# Patient Record
Sex: FEMALE | Race: BLACK OR AFRICAN AMERICAN | Employment: FULL TIME | URBAN - METROPOLITAN AREA
[De-identification: names, ages, dates, MRNs, and addresses within clinical notes are randomized per-mention and may not be internally consistent; named-entity substitution may affect disease eponyms.]

---

## 2017-01-20 ENCOUNTER — OFFICE VISIT (OUTPATIENT)
Dept: OBGYN CLINIC | Age: 57
End: 2017-01-20

## 2017-01-20 DIAGNOSIS — R10.2 PELVIC PAIN IN FEMALE: Primary | ICD-10-CM

## 2017-01-20 NOTE — PROGRESS NOTES
Pelvic Pain evaluation    Pio Oropeza is a 49 y.o. female who complains of pelvic pain. The pain is described as pulsing, and is 5-10/10 in intensity. Pain is located in the lower abdomen diffusely with radiation to lower back. The pain started 2 months ago. Her symptoms have been unchanged since. Aggravating factors: intercourse. Alleviating factors: none. Associated symptoms: frequency. She is also currently seeing a urologist for this problem. The patient denies constipation and diarrhea. Past Medical History   Diagnosis Date    Elevated cholesterol     Interstitial cystitis     MTHFR mutation [facility])      Past Surgical History   Procedure Laterality Date    Hx left salpingo-oophorectomy      Hx total vaginal hysterectomy       Social History     Occupational History    Not on file. Social History Main Topics    Smoking status: Never Smoker    Smokeless tobacco: Not on file    Alcohol use No    Drug use: No    Sexual activity: Not Currently     Family History   Problem Relation Age of Onset    Breast Cancer         No Known Allergies  Prior to Admission medications    Medication Sig Start Date End Date Taking? Authorizing Provider   simvastatin (ZOCOR) 10 mg tablet  5/13/16   Historical Provider   butalbital-acetaminophen-caffeine (ESGIC PLUS) -40 mg per tablet Take 1 Tab by mouth every four (4) hours as needed for Pain. Historical Provider   melatonin tab tablet Take 1 Tab by mouth nightly. For cluster-headache prevention 5/19/16   Elmira Stevenson MD   amitriptyline (ELAVIL) 25 mg tablet Take 1 to 2 tablets at bedtime. For headache prevention. Caution: may cause drowsiness, dry mouth, weight gain.  5/19/16   Elmira Stevenson MD   SUMAtriptan (IMITREX) 100 mg tablet 1 tab at onset of moderate-severe headache; may repeat 1 tab in 2 hours; Limit: 2 tabs in 24/ hrs, not more than 3 days a week 5/19/16   Elmira Stevenson MD   LORazepam (ATIVAN) 1 mg tablet 1 mg as needed. 3/2/16   Historical Provider   ELIDEL 1 % topical cream  3/15/16   Historical Provider   traZODone (DESYREL) 50 mg tablet  4/6/16   Historical Provider   estradiol (VIVELLE-DOT) 0.0375 mg/24 hr Apply twice a week 4/11/16   Amber Conway MD   PARoxetine mesylate (BRISDELLE) 7.5 mg cap Take 7.5 capsules by mouth daily. 12/31/14   Amber Conway MD   simvastatin (ZOCOR) 20 mg tablet Take  by mouth nightly. Historical Provider        Review of Systems: History obtained from the patient  Constitutional: negative for weight loss, fever, night sweats  Breast: negative for breast lumps, nipple discharge, galactorrhea  GI: negative for change in bowel habits, abdominal pain, black or bloody stools  : negative for frequency, dysuria, hematuria, vaginal discharge  MSK: negative for back pain, joint pain, muscle pain  Skin: negative for itching, rash, hives  Psych: negative for anxiety, depression, change in mood      Objective: There were no vitals taken for this visit.     Physical Exam:     Constitutional  · Appearance: well-nourished, well developed, alert, in no acute distress    Gastrointestinal  · Abdominal Examination: abdomen non-tender to palpation, normal bowel sounds, no masses present  · Liver and spleen: no hepatomegaly present, spleen not palpable  · Hernias: no hernias identified    Genitourinary  · External Genitalia: normal appearance for age, no discharge present, no tenderness present, no inflammatory lesions present, no masses present, no atrophy present  · Vagina: normal vaginal vault without central or paravaginal defects, no discharge present, no inflammatory lesions present, no masses present  · Bladder: non-tender to palpation  · Urethra: appears normal  · Cervix: absent   · Uterus: absent  · Adnexa: no adnexal tenderness present, no adnexal masses present  · Perineum: perineum within normal limits, no evidence of trauma, no rashes or skin lesions present  · Anus: anus within normal limits, no hemorrhoids present  · Inguinal Lymph Nodes: no lymphadenopathy present    Skin  · General Inspection: no rash, no lesions identified    Neurologic/Psychiatric  · Mental Status:  · Orientation: grossly oriented to person, place and time  · Mood and Affect: mood normal, affect appropriate    Assessment:  Pelvic pain of ?etiology. Plan:   I recommend an US to assess her ovary. She has a CT scheduled by Urology and will get that first..

## 2017-01-20 NOTE — PATIENT INSTRUCTIONS
Pelvic Pain: Care Instructions  Your Care Instructions    Pelvic pain, or pain in the lower belly, can have many causes. Often pelvic pain is not serious and gets better in a few days. If your pain continues or gets worse, you may need tests and treatment. Tell your doctor about any new symptoms. These may be signs of a serious problem. Follow-up care is a key part of your treatment and safety. Be sure to make and go to all appointments, and call your doctor if you are having problems. It's also a good idea to know your test results and keep a list of the medicines you take. How can you care for yourself at home? · Rest until you feel better. Lie down, and raise your legs by placing a pillow under your knees. · Drink plenty of fluids. You may find that small, frequent sips are easier on your stomach than if you drink a lot at once. Avoid drinks with carbonation or caffeine, such as soda pop, tea, or coffee. · Try eating several small meals instead of 2 or 3 large ones. Eat mild foods, such as rice, dry toast or crackers, bananas, and applesauce. Avoid fatty and spicy foods, other fruits, and alcohol until 48 hours after your symptoms have gone away. · Take an over-the-counter pain medicine, such as acetaminophen (Tylenol), ibuprofen (Advil, Motrin), or naproxen (Aleve). Read and follow all instructions on the label. · Do not take two or more pain medicines at the same time unless the doctor told you to. Many pain medicines have acetaminophen, which is Tylenol. Too much acetaminophen (Tylenol) can be harmful. · You can put a heating pad, a warm cloth, or moist heat on your belly to relieve pain. When should you call for help? Call 911 anytime you think you may need emergency care. For example, call if:  · You passed out (lost consciousness). Call your doctor now or seek immediate medical care if:  · Your pain is getting worse. · Your pain becomes focused in one area of your belly.   · You have severe vaginal bleeding. Severe means that you are soaking through your usual pads or tampons every hour for 2 or more hours and passing clots of blood. · You have new symptoms such as fever, urinary problems or unexpected vaginal bleeding. · You are dizzy or lightheaded, or you feel like you may faint. Watch closely for changes in your health, and be sure to contact your doctor if:  · You do not get better as expected. Where can you learn more? Go to http://debbie-michael.info/. Enter [phone] in the search box to learn more about \"Pelvic Pain: Care Instructions. \"  Current as of: February 25, 2016  Content Version: 11.1  © 1426-8883 [employer]. Care instructions adapted under license by [employer] (which disclaims liability or warranty for this information). If you have questions about a medical condition or this instruction, always ask your healthcare professional. [address] any warranty or liability for your use of this information.

## 2017-02-15 ENCOUNTER — TELEPHONE (OUTPATIENT)
Dept: OBGYN CLINIC | Age: 57
End: 2017-02-15

## 2017-02-15 RX ORDER — ESTRADIOL 0.04 MG/D
FILM, EXTENDED RELEASE TRANSDERMAL
Qty: 24 PATCH | Refills: 1 | Status: SHIPPED | OUTPATIENT
Start: 2017-02-15 | End: 2017-04-13

## 2017-02-15 NOTE — TELEPHONE ENCOUNTER
Pt krishan for a refill on her Estradiol to be sent to [facility] pharmacy in [place] on [address]. Refilled medication until AE 4/2017.

## 2017-04-13 ENCOUNTER — OFFICE VISIT (OUTPATIENT)
Dept: OBGYN CLINIC | Age: 57
End: 2017-04-13

## 2017-04-13 ENCOUNTER — APPOINTMENT (OUTPATIENT)
Dept: MAMMOGRAPHY | Age: 57
End: 2017-04-13

## 2017-04-13 VITALS
HEART RATE: 78 BPM | BODY MASS INDEX: 25.34 KG/M2 | HEIGHT: 63 IN | DIASTOLIC BLOOD PRESSURE: 80 MMHG | WEIGHT: 143 LBS | RESPIRATION RATE: 19 BRPM | SYSTOLIC BLOOD PRESSURE: 131 MMHG

## 2017-04-13 DIAGNOSIS — Z12.31 ENCOUNTER FOR SCREENING MAMMOGRAM FOR MALIGNANT NEOPLASM OF BREAST: ICD-10-CM

## 2017-04-13 DIAGNOSIS — Z11.51 SPECIAL SCREENING EXAMINATION FOR HUMAN PAPILLOMAVIRUS (HPV): ICD-10-CM

## 2017-04-13 DIAGNOSIS — Z01.419 WELL FEMALE EXAM WITH ROUTINE GYNECOLOGICAL EXAM: Primary | ICD-10-CM

## 2017-04-13 RX ORDER — ESTRADIOL 0.04 MG/D
1 FILM, EXTENDED RELEASE TRANSDERMAL 2 TIMES WEEKLY
Qty: 24 PATCH | Refills: 3 | Status: SHIPPED | OUTPATIENT
Start: 2017-04-14 | End: 2018-09-18 | Stop reason: SDUPTHER

## 2017-04-13 NOTE — PATIENT INSTRUCTIONS
Well Visit, Women 48 to 72: Care Instructions  Your Care Instructions  Physical exams can help you stay healthy. Your doctor has checked your overall health and may have suggested ways to take good care of yourself. He or she also may have recommended tests. At home, you can help prevent illness with healthy eating, regular exercise, and other steps. Follow-up care is a key part of your treatment and safety. Be sure to make and go to all appointments, and call your doctor if you are having problems. It's also a good idea to know your test results and keep a list of the medicines you take. How can you care for yourself at home? · Reach and stay at a healthy weight. This will lower your risk for many problems, such as obesity, diabetes, heart disease, and high blood pressure. · Get at least 30 minutes of exercise on most days of the week. Walking is a good choice. You also may want to do other activities, such as running, swimming, cycling, or playing tennis or team sports. · Do not smoke. Smoking can make health problems worse. If you need help quitting, talk to your doctor about stop-smoking programs and medicines. These can increase your chances of quitting for good. · Protect your skin from too much sun. When you're outdoors from 10 a.m. to 4 p.m., stay in the shade or cover up with clothing and a hat with a wide brim. Wear sunglasses that block UV rays. Even when it's cloudy, put broad-spectrum sunscreen (SPF 30 or higher) on any exposed skin. · See a dentist one or two times a year for checkups and to have your teeth cleaned. · Wear a seat belt in the car. · Limit alcohol to 1 drink a day. Too much alcohol can cause health problems. Follow your doctor's advice about when to have certain tests. These tests can spot problems early. · Cholesterol.  Your doctor will tell you how often to have this done based on your age, family history, or other things that can increase your risk for heart attack and stroke. · Blood pressure. Have your blood pressure checked during a routine doctor visit. Your doctor will tell you how often to check your blood pressure based on your age, your blood pressure results, and other factors. · Mammogram. Ask your doctor how often you should have a mammogram, which is an X-ray of your breasts. A mammogram can spot breast cancer before it can be felt and when it is easiest to treat. · Pap test and pelvic exam. Ask your doctor how often you should have a Pap test. You may not need to have a Pap test as often as you used to. · Vision. Have your eyes checked every year or two or as often as your doctor suggests. Some experts recommend that you have yearly exams for glaucoma and other age-related eye problems starting at age 48. · Hearing. Tell your doctor if you notice any change in your hearing. You can have tests to find out how well you hear. · Diabetes. Ask your doctor whether you should have tests for diabetes. · Colon cancer. You should begin tests for colon cancer at age 48. You may have one of several tests. Your doctor will tell you how often to have tests based on your age and risk. Risks include whether you already had a precancerous polyp removed from your colon or whether your parents, sisters and brothers, or children have had colon cancer. · Thyroid disease. Talk to your doctor about whether to have your thyroid checked as part of a regular physical exam. Women have an increased chance of a thyroid problem. · Osteoporosis. You should begin tests for bone density at age 72. If you are younger than 72, ask your doctor whether you have factors that may increase your risk for this disease. You may want to have this test before age 72. · Heart attack and stroke risk. At least every 4 to 6 years, you should have your risk for heart attack and stroke assessed.  Your doctor uses factors such as your age, blood pressure, cholesterol, and whether you smoke or have diabetes to show what your risk for a heart attack or stroke is over the next 10 years. When should you call for help? Watch closely for changes in your health, and be sure to contact your doctor if you have any problems or symptoms that concern you. Where can you learn more? Go to http://debbie-michael.info/. Enter V782 in the search box to learn more about \"Well Visit, Women 50 to 72: Care Instructions. \"  Current as of: July 19, 2016  Content Version: 11.2  © 9815-7396 [employer]. Care instructions adapted under license by [employer] (which disclaims liability or warranty for this information). If you have questions about a medical condition or this instruction, always ask your healthcare professional. [address] any warranty or liability for your use of this information.

## 2017-04-13 NOTE — PROGRESS NOTES
Sangeeta De La Rosa is a ,  49 y.o. female [address] whose No LMP recorded. Patient has had a hysterectomy. was on  who presents for her annual checkup. She is having significant discharge. She also wants to discuss her pelvic pain. Her lower abdominal pain is better. She is seeing GI and Urology. Hormone Status:    She is not having vasomotor symptoms. The patient is using HRT: vivelle dot    Sexual history:    She  reports that she does not currently engage in sexual activity. Medical conditions:    Since her last annual GYN exam about one year ago, she has had the following changes in her health history: none. Pap and Mammogram History:    Her most recent Pap smear was negative and HPV negative obtained 2 year(s) ago. The patient had her mammogram today in our office. Breast Cancer History/Substance Abuse:    She has no family history of breast cancer. Past Medical History:   Diagnosis Date    Elevated cholesterol     Interstitial cystitis     MTHFR mutation ([facility] [address]      Past Surgical History:   Procedure Laterality Date    HX LEFT SALPINGO-OOPHORECTOMY      HX TOTAL VAGINAL HYSTERECTOMY       Tobacco History:  reports that she has never smoked. She does not have any smokeless tobacco history on file. Alcohol Abuse:  reports that she does not drink alcohol. Drug Abuse:  reports that she does not use illicit drugs. Current Outpatient Prescriptions   Medication Sig Dispense Refill    estradiol (VIVELLE-DOT) 0.0375 mg/24 hr Apply twice a week 24 Patch 1    amitriptyline (ELAVIL) 25 mg tablet Take 1 to 2 tablets at bedtime. For headache prevention. Caution: may cause drowsiness, dry mouth, weight gain. 60 Tab 1    ELIDEL 1 % topical cream       simvastatin (ZOCOR) 10 mg tablet       butalbital-acetaminophen-caffeine (ESGIC PLUS) -40 mg per tablet Take 1 Tab by mouth every four (4) hours as needed for Pain.       melatonin tab tablet Take 1 Tab by mouth nightly. For cluster-headache prevention 30 Tab 11    SUMAtriptan (IMITREX) 100 mg tablet 1 tab at onset of moderate-severe headache; may repeat 1 tab in 2 hours; Limit: 2 tabs in 24/ hrs, not more than 3 days a week 12 Tab 2    LORazepam (ATIVAN) 1 mg tablet 1 mg as needed. 3    traZODone (DESYREL) 50 mg tablet   0    PARoxetine mesylate (BRISDELLE) 7.5 mg cap Take 7.5 capsules by mouth daily. 30 capsule 12    simvastatin (ZOCOR) 20 mg tablet Take  by mouth nightly. Allergies: Morphine and Sulfa (sulfonamide antibiotics)   Social History     Social History    Marital status: SINGLE     Spouse name: N/A    Number of children: N/A    Years of education: N/A     Occupational History    Not on file.      Social History Main Topics    Smoking status: Never Smoker    Smokeless tobacco: Not on file    Alcohol use No    Drug use: No    Sexual activity: Not Currently     Other Topics Concern    Not on file     Social History Narrative     Patient Active Problem List   Diagnosis Code    Transient right leg weakness R29.898    Hyperreflexia R29.2    Essential tremor G25.0    Episodic cluster headache, not intractable G44.019       Review of Systems - History obtained from the patient  Constitutional: negative for weight loss, fever, night sweats  HEENT: negative for hearing loss, earache, congestion, snoring, sorethroat  CV: negative for chest pain, palpitations, edema  Resp: negative for cough, shortness of breath, wheezing  GI: negative for change in bowel habits, abdominal pain, black or bloody stools  : negative for frequency, dysuria, hematuria, vaginal discharge  MSK: negative for back pain, joint pain, muscle pain  Breast: negative for breast lumps, nipple discharge, galactorrhea  Skin :negative for itching, rash, hives  Neuro: negative for dizziness, headache, confusion, weakness  Psych: negative for anxiety, depression, change in mood  Heme/lymph: negative for bleeding, bruising, pallor    Physical Exam    Visit Vitals    /80 (BP 1 Location: Left arm, BP Patient Position: Sitting)    Pulse 78    Resp 19    Ht 5' 3\" (1.6 m)    Wt 143 lb (64.9 kg)    BMI 25.33 kg/m2     Constitutional  · Appearance: well-nourished, well developed, alert, in no acute distress    HENT  · Head and Face: appears normal    Neck  · Inspection/Palpation: normal appearance, no masses or tenderness  Lymph Nodes: no lymphadenopathy present    Chest  · Respiratory Effort: breathing normal    Breasts  · Inspection of Breasts: breasts symmetrical, no skin changes, no discharge present, nipple appearance normal, no skin retraction present  · Palpation of Breasts and Axillae: no masses present on palpation, no breast tenderness  · Axillary Lymph Nodes: no lymphadenopathy present    Gastrointestinal  · Abdominal Examination: abdomen non-tender to palpation, normal bowel sounds, no masses present  · Liver and spleen: no hepatomegaly present, spleen not palpable  · Hernias: no hernias identified    Genitourinary  · External Genitalia: normal appearance for age, no discharge present, no tenderness present, no inflammatory lesions present, no masses present, no atrophy present  · Vagina: normal vaginal vault without central or paravaginal defects, no discharge present, no inflammatory lesions present, no masses present  · Bladder: non-tender to palpation  · Urethra: appears normal  · Cervix: absent  · Uterus: absent  · Adnexa: no adnexal tenderness present, no adnexal masses present  · Perineum: perineum within normal limits, no evidence of trauma, no rashes or skin lesions present  · Anus: anus within normal limits, no hemorrhoids present  · Inguinal Lymph Nodes: no lymphadenopathy present      Skin  · General Inspection: no rash, no lesions identified    Neurologic/Psychiatric  · Mental Status:  · Orientation: grossly oriented to person, place and time  · Mood and Affect: mood normal, affect appropriate    . Assessment:  Routine gynecologic examination  Her current medical status is satisfactory with no evidence of significant gynecologic issues. Plan:  Counseled re: diet, exercise, healthy lifestyle  Return for yearly wellness visits  Rec annual mammogram  Patient Verbalized understanding  If pain persists then RTO for an US.

## 2017-04-20 LAB
CYTOLOGIST CVX/VAG CYTO: ABNORMAL
CYTOLOGY CVX/VAG DOC THIN PREP: ABNORMAL
CYTOLOGY HISTORY:: ABNORMAL
DX ICD CODE: ABNORMAL
HPV I/H RISK 1 DNA CVX QL PROBE+SIG AMP: POSITIVE
HPV16 DNA CVX QL PROBE+SIG AMP: NEGATIVE
HPV18 DNA CVX QL PROBE+SIG AMP: NEGATIVE
Lab: ABNORMAL
OTHER STN SPEC: ABNORMAL
PATH REPORT.FINAL DX SPEC: ABNORMAL
STAT OF ADQ CVX/VAG CYTO-IMP: ABNORMAL

## 2018-04-16 ENCOUNTER — OFFICE VISIT (OUTPATIENT)
Dept: OBGYN CLINIC | Age: 58
End: 2018-04-16

## 2018-04-16 VITALS
DIASTOLIC BLOOD PRESSURE: 81 MMHG | SYSTOLIC BLOOD PRESSURE: 120 MMHG | HEIGHT: 63 IN | BODY MASS INDEX: 25.34 KG/M2 | RESPIRATION RATE: 19 BRPM | HEART RATE: 76 BPM | WEIGHT: 143 LBS

## 2018-04-16 DIAGNOSIS — R87.810 CERVICAL HIGH RISK HPV (HUMAN PAPILLOMAVIRUS) TEST POSITIVE: ICD-10-CM

## 2018-04-16 DIAGNOSIS — N89.8 VAGINAL DISCHARGE: ICD-10-CM

## 2018-04-16 DIAGNOSIS — Z01.419 WELL FEMALE EXAM WITH ROUTINE GYNECOLOGICAL EXAM: Primary | ICD-10-CM

## 2018-04-16 LAB — WET MOUNT POCT, WMPOCT: NORMAL

## 2018-04-16 NOTE — PROGRESS NOTES
Ramon Heaton is a ,  56 y.o. female [address] whose No LMP recorded. Patient has had a hysterectomy. was on  who presents for her annual checkup. She is having significant vaginal discharge. Hormone Status:    She is not having vasomotor symptoms. The patient is using HRT: vivelle dot    Sexual history:    She  reports that she does not currently engage in sexual activity. Medical conditions:    Since her last annual GYN exam about one year ago, she has had the following changes in her health history: none. Pap and Mammogram History:    Her most recent Pap smear was negative and HPV positive obtained 1 year(s) ago. The patient has not had a recent mammogram.    Breast Cancer History/Substance Abuse:    She has no family history of breast cancer. Osteoporosis History:    Family history does not include a first or second degree relative with osteopenia or osteoporosis. A bone density scan has not been previously obtained. Past Medical History:   Diagnosis Date    Elevated cholesterol     Interstitial cystitis     MTHFR mutation ([facility] [address]      Past Surgical History:   Procedure Laterality Date    HX LEFT SALPINGO-OOPHORECTOMY      HX TOTAL VAGINAL HYSTERECTOMY       Tobacco History:  reports that she has never smoked. She has never used smokeless tobacco.  Alcohol Abuse:  reports that she does not drink alcohol. Drug Abuse:  reports that she does not use illicit drugs. Current Outpatient Prescriptions   Medication Sig Dispense Refill    estradiol (VIVELLE-DOT) 0.0375 mg/24 hr 1 Patch by TransDERmal route two (2) times a week. 24 Patch 3    ELIDEL 1 % topical cream       amitriptyline (ELAVIL) 25 mg tablet Take 1 to 2 tablets at bedtime. For headache prevention. Caution: may cause drowsiness, dry mouth, weight gain. 60 Tab 1     Allergies: Bactrim [sulfamethoprim]; Ciprofloxacin; Clindamycin;  Doxycycline; Morphine; and Sulfa (sulfonamide antibiotics) Social History     Social History    Marital status: SINGLE     Spouse name: N/A    Number of children: N/A    Years of education: N/A     Occupational History    Not on file.      Social History Main Topics    Smoking status: Never Smoker    Smokeless tobacco: Never Used    Alcohol use No    Drug use: No    Sexual activity: Not Currently     Other Topics Concern    Not on file     Social History Narrative     Patient Active Problem List   Diagnosis Code    Transient right leg weakness R29.898    Hyperreflexia R29.2    Essential tremor G25.0    Episodic cluster headache, not intractable G44.019       Review of Systems - History obtained from the patient  Constitutional: negative for weight loss, fever, night sweats  HEENT: negative for hearing loss, earache, congestion, snoring, sorethroat  CV: negative for chest pain, palpitations, edema  Resp: negative for cough, shortness of breath, wheezing  GI: negative for change in bowel habits, abdominal pain, black or bloody stools  : negative for frequency, dysuria, hematuria, vaginal discharge  MSK: negative for back pain, joint pain, muscle pain  Breast: negative for breast lumps, nipple discharge, galactorrhea  Skin :negative for itching, rash, hives  Neuro: negative for dizziness, headache, confusion, weakness  Psych: negative for anxiety, depression, change in mood  Heme/lymph: negative for bleeding, bruising, pallor    Physical Exam    Visit Vitals    /81 (BP 1 Location: Left arm, BP Patient Position: Sitting)    Pulse 76    Resp 19    Ht 5' 3\" (1.6 m)    Wt 143 lb (64.9 kg)    BMI 25.33 kg/m2     Constitutional  · Appearance: well-nourished, well developed, alert, in no acute distress    HENT  · Head and Face: appears normal    Neck  · Inspection/Palpation: normal appearance, no masses or tenderness  Lymph Nodes: no lymphadenopathy present    Chest  · Respiratory Effort: breathing normal    Breasts  · Inspection of Breasts: breasts symmetrical, no skin changes, no discharge present, nipple appearance normal, no skin retraction present  · Palpation of Breasts and Axillae: no masses present on palpation, no breast tenderness  · Axillary Lymph Nodes: no lymphadenopathy present    Gastrointestinal  · Abdominal Examination: abdomen non-tender to palpation, normal bowel sounds, no masses present  · Liver and spleen: no hepatomegaly present, spleen not palpable  · Hernias: no hernias identified    Genitourinary  · External Genitalia: normal appearance for age, no discharge present, no tenderness present, no inflammatory lesions present, no masses present, no atrophy present  · Vagina: normal vaginal vault without central or paravaginal defects, white discharge present, no inflammatory lesions present, no masses present  · Bladder: non-tender to palpation  · Urethra: appears normal  · Cervix: absent  · Uterus: absent  · Adnexa: no adnexal tenderness present, no adnexal masses present  · Perineum: perineum within normal limits, no evidence of trauma, no rashes or skin lesions present  · Anus: anus within normal limits, no hemorrhoids present  · Inguinal Lymph Nodes: no lymphadenopathy present      Skin  · General Inspection: no rash, no lesions identified    Neurologic/Psychiatric  · Mental Status:  · Orientation: grossly oriented to person, place and time  · Mood and Affect: mood normal, affect appropriate    . Wet prep and KOH neg    Assessment:  Routine gynecologic examination  Her current medical status is satisfactory with a vaginal discharge.     Plan:  Counseled re: diet, exercise, healthy lifestyle  Return for yearly wellness visits  Rec annual mammogram  Patient Verbalized understanding  Nuswab sent

## 2018-04-18 LAB
CYTOLOGIST CVX/VAG CYTO: NORMAL
CYTOLOGY CVX/VAG DOC THIN PREP: NORMAL
CYTOLOGY HISTORY:: NORMAL
DX ICD CODE: NORMAL
HPV I/H RISK 1 DNA CVX QL PROBE+SIG AMP: NEGATIVE
Lab: NORMAL
OTHER STN SPEC: NORMAL
PATH REPORT.FINAL DX SPEC: NORMAL
STAT OF ADQ CVX/VAG CYTO-IMP: NORMAL

## 2018-04-19 LAB
A VAGINAE DNA VAG QL NAA+PROBE: NORMAL SCORE
BVAB2 DNA VAG QL NAA+PROBE: NORMAL SCORE
C ALBICANS DNA VAG QL NAA+PROBE: NEGATIVE
C GLABRATA DNA VAG QL NAA+PROBE: NEGATIVE
MEGA1 DNA VAG QL NAA+PROBE: NORMAL SCORE

## 2018-06-26 ENCOUNTER — APPOINTMENT (OUTPATIENT)
Dept: OBGYN CLINIC | Age: 58
End: 2018-06-26

## 2018-07-06 ENCOUNTER — TELEPHONE (OUTPATIENT)
Dept: OBGYN CLINIC | Age: 58
End: 2018-07-06

## 2018-07-06 DIAGNOSIS — R92.8 ABNORMAL MAMMOGRAM OF RIGHT BREAST: Primary | ICD-10-CM

## 2018-07-06 NOTE — TELEPHONE ENCOUNTER
Patient aware of results and MD recommendations by phone. Patient explained in detail what the results are. Patient wanted to know how to get the additional views/US scheduled and patient was provided with the scheduling number. Patient wanted to contact her insurance company first and then will call to schedule the appt. Patient appreciative of the help. Orders placed in chart.

## 2018-07-10 ENCOUNTER — HOSPITAL ENCOUNTER (OUTPATIENT)
Dept: MAMMOGRAPHY | Age: 58
Discharge: HOME | End: 2018-07-10
Attending: OBSTETRICS & GYNECOLOGY
Payer: COMMERCIAL

## 2018-07-10 DIAGNOSIS — R92.8 ABNORMAL MAMMOGRAM OF RIGHT BREAST: ICD-10-CM

## 2018-07-10 PROCEDURE — 77065 DX MAMMO INCL CAD UNI: CPT

## 2018-09-19 RX ORDER — ESTRADIOL 0.04 MG/D
FILM, EXTENDED RELEASE TRANSDERMAL
Qty: 24 PATCH | Refills: 2 | Status: SHIPPED | OUTPATIENT
Start: 2018-09-19 | End: 2019-06-27

## 2018-10-03 ENCOUNTER — OFFICE VISIT (OUTPATIENT)
Dept: NEUROLOGY | Age: 58
End: 2018-10-03

## 2018-10-03 VITALS
BODY MASS INDEX: 25.34 KG/M2 | WEIGHT: 143 LBS | DIASTOLIC BLOOD PRESSURE: 74 MMHG | OXYGEN SATURATION: 98 % | SYSTOLIC BLOOD PRESSURE: 122 MMHG | HEIGHT: 63 IN | RESPIRATION RATE: 20 BRPM | HEART RATE: 82 BPM

## 2018-10-03 DIAGNOSIS — R29.2 HYPERREFLEXIA: ICD-10-CM

## 2018-10-03 DIAGNOSIS — R29.898 TRANSIENT RIGHT LEG WEAKNESS: ICD-10-CM

## 2018-10-03 DIAGNOSIS — R42 DIZZINESS: ICD-10-CM

## 2018-10-03 DIAGNOSIS — R42 DIZZINESS: Primary | ICD-10-CM

## 2018-10-03 DIAGNOSIS — R41.3 MEMORY LOSS: ICD-10-CM

## 2018-10-03 DIAGNOSIS — S09.90XA CLOSED HEAD INJURY WITHOUT LOSS OF CONSCIOUSNESS, INITIAL ENCOUNTER: Primary | ICD-10-CM

## 2018-10-03 RX ORDER — SIMVASTATIN 10 MG/1
TABLET, FILM COATED ORAL
Refills: 1 | COMMUNITY
Start: 2018-07-05

## 2018-10-03 RX ORDER — DOXYCYCLINE 40 MG/1
CAPSULE ORAL
Refills: 2 | COMMUNITY
Start: 2018-09-26 | End: 2021-10-27

## 2018-10-03 NOTE — MR AVS SNAPSHOT
[facility]  [address] [place] [address] [phone] Patient: Tank Minaya 
MRN: [id number] :1960 Visit Information Date & Time Provider Department Dept. Phone Encounter #  
 10/3/2018  3:00 PM Thompson Gerard NP [facility] [phone] [id number] Upcoming Health Maintenance Date Due Hepatitis C Screening 1960 DTaP/Tdap/Td series (1 - Tdap) 1983 Shingrix Vaccine Age 50> (1 of 2) 2012 FOBT Q 1 YEAR AGE 50-75 2012 Influenza Age 5 to Adult 2018 BREAST CANCER SCRN MAMMOGRAM 7/10/2020 PAP AKA CERVICAL CYTOLOGY 2021 Allergies as of 10/3/2018  Review Complete On: 10/3/2018 By: Thompson Gerard NP Severity Noted Reaction Type Reactions Bactrim [Sulfamethoprim]  2018    Rash Ciprofloxacin  2018    Rash Clindamycin  2018    Rash Doxycycline  2018    Rash Morphine  2017    Nausea and Vomiting  
 Sulfa (Sulfonamide Antibiotics)  2013    Rash Current Immunizations  Never Reviewed No immunizations on file. Not reviewed this visit You Were Diagnosed With   
  
 Codes Comments Closed head injury without loss of consciousness, initial encounter    -  Primary ICD-10-CM: S09. 90XA ICD-9-CM: 959.01 Hyperreflexia     ICD-10-CM: R29.2 ICD-9-CM: 796.1 Transient right leg weakness     ICD-10-CM: R29.898 ICD-9-CM: 729.89 Memory loss     ICD-10-CM: R41.3 ICD-9-CM: 780.93 Dizziness     ICD-10-CM: G91 ICD-9-CM: 780.4 Vitals BP Pulse Resp Height(growth percentile) Weight(growth percentile) SpO2  
 122/74 82 20 5' 3\" (1.6 m) 143 lb (64.9 kg) 98% BMI OB Status Smoking Status 25.33 kg/m2 Hysterectomy Never Smoker Vitals History BMI and BSA Data Body Mass Index Body Surface Area  
 25.33 kg/m 2 1.7 m 2 Preferred Pharmacy Pharmacy Name Phone [id number] [address] [facility] [phone] Your Updated Medication List  
  
   
This list is accurate as of 10/3/18  4:01 PM.  Always use your most recent med list.  
  
  
  
  
 doxycycline monohydrate 40 mg capsule TAKE ONE CAPSULE BY MOUTH EVERY DAY  
  
 ELIDEL 1 % topical cream  
Generic drug:  pimecrolimus  
as needed. estradiol 0.0375 mg/24 hr  
Commonly known as:  VIVELLE  
1 PATCH BY TRANSDERMAL ROUTE TWO (2) TIMES A WEEK. simvastatin 10 mg tablet Commonly known as:  ZOCOR  
TAKE 1 TABLET EVERY DAY IN THE EVENING We Performed the Following EEG AMB NEURO [id number] CPT(R)] To-Do List   
 10/03/2018 Imaging:  DUPLEX CAROTID BILATERAL AMB NEURO   
  
 10/03/2018 Imaging:  MRI BRAIN W WO CONT Referral Information Referral ID Referred By Referred To  
  
 [id number] Ramiro Whyte Not Available Visits Status Start Date End Date 1 New Request 10/3/18 10/3/19 If your referral has a status of pending review or denied, additional information will be sent to support the outcome of this decision. Patient Instructions A Healthy Lifestyle: Care Instructions Your Care Instructions A healthy lifestyle can help you feel good, stay at a healthy weight, and have plenty of energy for both work and play. A healthy lifestyle is something you can share with your whole family. A healthy lifestyle also can lower your risk for serious health problems, such as high blood pressure, heart disease, and diabetes. You can follow a few steps listed below to improve your health and the health of your family. Follow-up care is a key part of your treatment and safety. Be sure to make and go to all appointments, and call your doctor if you are having problems. It's also a good idea to know your test results and keep a list of the medicines you take. How can you care for yourself at home? · Do not eat too much sugar, fat, or fast foods. You can still have dessert and treats now and then. The goal is moderation. · Start small to improve your eating habits. Pay attention to portion sizes, drink less juice and soda pop, and eat more fruits and vegetables. ¨ Eat a healthy amount of food. A 3-ounce serving of meat, for example, is about the size of a deck of cards. Fill the rest of your plate with vegetables and whole grains. ¨ Limit the amount of soda and sports drinks you have every day. Drink more water when you are thirsty. ¨ Eat at least 5 servings of fruits and vegetables every day. It may seem like a lot, but it is not hard to reach this goal. A serving or helping is 1 piece of fruit, 1 cup of vegetables, or 2 cups of leafy, raw vegetables. Have an apple or some carrot sticks as an afternoon snack instead of a candy bar. Try to have fruits and/or vegetables at every meal. 
· Make exercise part of your daily routine. You may want to start with simple activities, such as walking, bicycling, or slow swimming. Try to be active 30 to 60 minutes every day. You do not need to do all 30 to 60 minutes all at once. For example, you can exercise 3 times a day for 10 or 20 minutes. Moderate exercise is safe for most people, but it is always a good idea to talk to your doctor before starting an exercise program. 
· Keep moving. Friddie Clos the lawn, work in the garden, or Wattpad. Take the stairs instead of the elevator at work. · If you smoke, quit. People who smoke have an increased risk for heart attack, stroke, cancer, and other lung illnesses. Quitting is hard, but there are ways to boost your chance of quitting tobacco for good. ¨ Use nicotine gum, patches, or lozenges. ¨ Ask your doctor about stop-smoking programs and medicines. ¨ Keep trying.  
In addition to reducing your risk of diseases in the future, you will notice some benefits soon after you stop using tobacco. If you have shortness of breath or asthma symptoms, they will likely get better within a few weeks after you quit. · Limit how much alcohol you drink. Moderate amounts of alcohol (up to 2 drinks a day for men, 1 drink a day for women) are okay. But drinking too much can lead to liver problems, high blood pressure, and other health problems. Family health If you have a family, there are many things you can do together to improve your health. · Eat meals together as a family as often as possible. · Eat healthy foods. This includes fruits, vegetables, lean meats and dairy, and whole grains. · Include your family in your fitness plan. Most people think of activities such as jogging or tennis as the way to fitness, but there are many ways you and your family can be more active. Anything that makes you breathe hard and gets your heart pumping is exercise. Here are some tips: 
¨ Walk to do errands or to take your child to school or the bus. ¨ Go for a family bike ride after dinner instead of watching TV. Where can you learn more? Go to http://debbie-michael.info/. Enter C261 in the search box to learn more about \"A Healthy Lifestyle: Care Instructions. \" Current as of: December 7, 2017 Content Version: 11.7 © 4731-1486 [employer]. Care instructions adapted under license by [employer] (which disclaims liability or warranty for this information). If you have questions about a medical condition or this instruction, always ask your healthcare professional. [place] [address] any warranty or liability for your use of this information. Introducing [facility]! Dear Brandi Heath: Thank you for requesting a [employer] account. Our records indicate that you already have an active [employer] account. You can access your account anytime at [email or web address] Park [email or web address] Ride. ProQuo/Discount Park and Ride Did you know that you can access your hospital and ER discharge instructions at any time in [employer]? You can also review all of your test results from your hospital stay or ER visit. Additional Information If you have questions, please visit the Frequently Asked Questions section of the [employer] website at [email or web address] Remember, [employer] is NOT to be used for urgent needs. For medical emergencies, dial 911. Now available from your iPhone and Android! Please provide this summary of care documentation to your next provider. Your primary care clinician is listed as Shivani Stewart. If you have any questions after today's visit, please call [phone].

## 2018-10-03 NOTE — PATIENT INSTRUCTIONS

## 2018-10-03 NOTE — PROGRESS NOTES
Has been doing good since her last visit   No issues that she knows of other than when she had hit her head   Went to PCP 2 weeks ago, dizziness, lightheadness was driving home one day and was not sure how she got home she doesn't remember the ride   This past Friday she realized she had hit her head the drunk door had hit her head in the forehead region hard enough that she saw stars, a couple of weeks after that she hit her head again on her granite counter tops in the same place and thought that was what was causing her dizziness and lightheadedness from when she had hit her head

## 2018-10-03 NOTE — PROGRESS NOTES
Date:  10/03/18     Name:  Silvina Zafar  :  1960  MRN:  [id number]     PCP:  Iván Win MD    Chief Complaint   Patient presents with    Follow-up     concussion      HISTORY OF PRESENT ILLNESS: Patient presents today for further evaluation of episode of dizziness. Everything started after she hit her head. She hit her head in the same spot twice within a two week span. , hit again less than two weeks after this, and went to the PCP about a week after this. She hit her head the first time on the trunk door, it came down and hit her in the head. The second time was on the [employer] top. She bent down to get something and hit her head coming back up in the same. States that she started having dizziness for about four weeks but went to her PCP because she was driving home from work and just is not even sure how she got home. Sometimes, she feels faint or woozy. She can feel disoriented and off balance when walking. Sometimes, she will have a feeling like she will fall if she moves her head side to side or nodding. This occurs maybe once a day and will last a few seconds. When she saw her PCP, who did an exam and was advised to follow up in neurology if it does not go away. He did give her some Xanax to help with sleep because she has not been resting as well since she hit her head. She has had some issues with recall and memory. States that she will lose her train thought and has had difficulty with recall. This started in the last couple of days. Except as noted above, denies  fever, chills, cough. No CP or SOB. No dysuria, loss of bowel or bladder control. No Weight loss. Appetite good. Sleeping well. No sweats. No edema. No bruising or bleeding. No nausea or vomit. No diarrhea. No frequency, urgency, No depressive sxs. No anxiety. Denies sore throat, nasal congestion, nasal discharge, epistaxis, tinnitus, hearing loss, back pain, muscle pain, or joint pain. Current Outpatient Prescriptions   Medication Sig    simvastatin (ZOCOR) 10 mg tablet TAKE 1 TABLET EVERY DAY IN THE EVENING    doxycycline monohydrate 40 mg capsule TAKE ONE CAPSULE BY MOUTH EVERY DAY    estradiol (VIVELLE) 0.0375 mg/24 hr 1 PATCH BY TRANSDERMAL ROUTE TWO (2) TIMES A WEEK.  ELIDEL 1 % topical cream as needed. No current facility-administered medications for this visit. Allergies   Allergen Reactions    Bactrim [Sulfamethoprim] Rash    Ciprofloxacin Rash    Clindamycin Rash    Doxycycline Rash    Morphine Nausea and Vomiting    Sulfa (Sulfonamide Antibiotics) Rash     Past Medical History:   Diagnosis Date    Elevated cholesterol     Interstitial cystitis     MTHFR mutation ([facility] [address]      Past Surgical History:   Procedure Laterality Date    HX LEFT SALPINGO-OOPHORECTOMY      HX TOTAL VAGINAL HYSTERECTOMY       Social History     Social History    Marital status: SINGLE     Spouse name: N/A    Number of children: N/A    Years of education: N/A     Occupational History    Not on file. Social History Main Topics    Smoking status: Never Smoker    Smokeless tobacco: Never Used    Alcohol use No    Drug use: No    Sexual activity: Not Currently     Other Topics Concern    Not on file     Social History Narrative     Family History   Problem Relation Age of Onset    Breast Cancer Sister 55    Breast Cancer Maternal Aunt        PHYSICAL EXAMINATION:    Visit Vitals    /74    Pulse 82    Resp 20    Ht 5' 3\" (1.6 m)    Wt 64.9 kg (143 lb)    SpO2 98%    BMI 25.33 kg/m2     General:  Well defined, nourished, and groomed individual in no acute distress. Neck: Supple, nontender, no bruits, no pain with resistance to active range of motion. Heart: Regular rate and rhythm, no murmurs, rub, or gallop. Normal S1S2.   Lungs:  Clear to auscultation bilaterally with equal chest expansion, no cough, no wheeze  Musculoskeletal:  Extremities revealed no edema and had full range of motion of joints. Psych:  Good mood and bright affect    NEUROLOGICAL EXAMINATION:     Mental Status:   Alert and oriented to person, place, and time with recent and remote memory intact. Attention span and concentration are normal. Speech is fluent with a full fund of knowledge. Cranial Nerves:    II, III, IV, VI:  Visual acuity grossly intact. Visual fields are normal.    Pupils are equal, round, and reactive to light and accommodation. Extra-ocular movements are full and fluid. Fundoscopic exam was benign, no ptosis or nystagmus. V-XII: Hearing is grossly intact. Facial features are symmetric, with normal sensation and strength. The palate rises symmetrically and the tongue protrudes midline. Sternocleidomastoids 5/5. Motor Examination: Normal tone, bulk, and strength, 5/5 muscle strength throughout. No cogwheel rigidity or clonus present. Sensory exam:  Normal throughout to temperature and LT. Coordination:   Finger to nose and rapid arm movement testing was normal.   No resting or intention tremor    Gait and Station:  Steady while walking. Normal arm swing. No Rhomberg or pronator drift. No muscle wasting or fasiculations noted. Reflexes:  DTRs 2+ throughout. ASSESSMENT AND PLAN    ICD-10-CM ICD-9-CM    1. Closed head injury without loss of consciousness, initial encounter S09.90XA 959.01 MRI BRAIN W WO CONT   2. Hyperreflexia R29.2 796.1 MRI BRAIN W WO CONT   3. Transient right leg weakness R29.898 729.89 MRI BRAIN W WO CONT   4. Memory loss R41.3 780.93 MRI BRAIN W WO CONT      EEG AMB NEURO   5. Dizziness R42 780.4 DUPLEX CAROTID BILATERAL AMB NEURO      EEG AMB NEURO     Ms. Arreguin presented today for further evaluation of dizziness, disorientation, and balance disturbance.   Interestingly as she gets further away from her head injury, her issues with recall and memory as well as losing her train of thought and difficulty with recall has actually worsened instead of improved. For further evaluation, she will be set up for an MRI of her brain and EEG to assess for any structural damage related to her head injury. Given her description of dizziness with lightheadedness, she will be set up for a Doppler to assess for any vascular etiology. Follow-up after testing. Neha Alonzo

## 2018-10-12 NOTE — PROCEDURES
Carotid Doppler:     Date:  10/3/2018     Requesting Physician:  Abigail Boeck, NP     Indication:  Dizziness, evaluate for stenosis     B-mode imaging reveals no significant plaque throughout the carotid systems bilaterally. Doppler spectral analysis reveals no elevated velocities. Vertebral artery flow antegrade bilaterally. Interpretation:  Normal study.

## 2018-10-15 ENCOUNTER — HOSPITAL ENCOUNTER (OUTPATIENT)
Dept: MRI IMAGING | Age: 58
Discharge: HOME | End: 2018-10-15
Payer: COMMERCIAL

## 2018-10-15 VITALS — BODY MASS INDEX: 24.8 KG/M2 | WEIGHT: 140 LBS

## 2018-10-15 DIAGNOSIS — R41.3 MEMORY LOSS: ICD-10-CM

## 2018-10-15 DIAGNOSIS — S09.90XA CLOSED HEAD INJURY WITHOUT LOSS OF CONSCIOUSNESS, INITIAL ENCOUNTER: ICD-10-CM

## 2018-10-15 DIAGNOSIS — R29.2 HYPERREFLEXIA: ICD-10-CM

## 2018-10-15 DIAGNOSIS — R29.898 TRANSIENT RIGHT LEG WEAKNESS: ICD-10-CM

## 2018-10-15 PROCEDURE — A9575 INJ GADOTERATE MEGLUMI 0.1ML: HCPCS | Performed by: RADIOLOGY

## 2018-10-15 PROCEDURE — 74011250636 HC RX REV CODE- 250/636: Performed by: RADIOLOGY

## 2018-10-15 PROCEDURE — 70553 MRI BRAIN STEM W/O & W/DYE: CPT

## 2018-10-15 RX ORDER — GADOTERATE MEGLUMINE 376.9 MG/ML
12 INJECTION INTRAVENOUS
Status: COMPLETED | OUTPATIENT
Start: 2018-10-15 | End: 2018-10-15

## 2018-10-15 RX ADMIN — GADOTERATE MEGLUMINE 12 ML: 376.9 INJECTION INTRAVENOUS at 10:22

## 2018-10-17 ENCOUNTER — OFFICE VISIT (OUTPATIENT)
Dept: NEUROLOGY | Age: 58
End: 2018-10-17

## 2018-10-17 DIAGNOSIS — S09.90XA CLOSED HEAD INJURY WITHOUT LOSS OF CONSCIOUSNESS, INITIAL ENCOUNTER: ICD-10-CM

## 2018-10-17 DIAGNOSIS — R42 DIZZINESS: Primary | ICD-10-CM

## 2018-10-17 DIAGNOSIS — R29.898 TRANSIENT RIGHT LEG WEAKNESS: ICD-10-CM

## 2018-10-31 NOTE — PROCEDURES
EEG:      Date:  10/17/2018     Requesting Physician:  Marcella Burton NP    Indication:  Head trauma, loss of consciousness, dizziness      An EEG is requested in this 56 y.o. woman with head trauma, loss of consciousness and dizziness to evaluate for epileptiform abnormalities. Medications:  Medications are listed as Zocor, doxycycline, Vivelle. This tracing is obtained during the awake state. During wakefulness the background consists of diffuse low-voltage, fast-frequency beta wave activity. Hyperventilation is performed for three minutes with poor effort and little alters the tracing. Intermittent photic stimulation induces symmetric posterior driving responses. Sleep is not attained. Interpretation: This EEG recorded during the awake state is normal.  No epileptiform abnormalities are seen.

## 2019-06-27 ENCOUNTER — OFFICE VISIT (OUTPATIENT)
Dept: OBGYN CLINIC | Age: 59
End: 2019-06-27

## 2019-06-27 VITALS
HEART RATE: 99 BPM | RESPIRATION RATE: 19 BRPM | WEIGHT: 147 LBS | BODY MASS INDEX: 26.05 KG/M2 | SYSTOLIC BLOOD PRESSURE: 114 MMHG | HEIGHT: 63 IN | DIASTOLIC BLOOD PRESSURE: 76 MMHG

## 2019-06-27 DIAGNOSIS — Z01.419 WELL FEMALE EXAM WITH ROUTINE GYNECOLOGICAL EXAM: Primary | ICD-10-CM

## 2019-06-27 RX ORDER — ESTRADIOL 0.03 MG/D
1 FILM, EXTENDED RELEASE TRANSDERMAL 2 TIMES WEEKLY
Qty: 24 PATCH | Refills: 3 | Status: SHIPPED | OUTPATIENT
Start: 2019-06-28 | End: 2020-10-09 | Stop reason: SDUPTHER

## 2019-06-27 NOTE — PROGRESS NOTES
Stan Gonzalez is a ,  66 y.o. female [address] whose No LMP recorded. Patient has had a hysterectomy. was on  who presents for her annual checkup. She is having no significant problems. She wants to try and get off ERT. She stopped cold turkey and couldn't stand it. Wants to try a lower dose. Hormone Status:    She is not having vasomotor symptoms. The patient is using HRT: Vivelle     Sexual history:    She  reports that she does not currently engage in sexual activity. Medical conditions:    Since her last annual GYN exam about one year ago, she has had the following changes in her health history: none. Pap and Mammogram History:    Her most recent Pap smear was Negative and HPV negative obtained 1 year(s) ago. The patient has not had a recent mammogram.    Breast Cancer History/Substance Abuse:    She has no family history of breast cancer. Osteoporosis History:    Family history does not include a first or second degree relative with osteopenia or osteoporosis. A bone density scan has not been previously obtained. Past Medical History:   Diagnosis Date    Elevated cholesterol     Interstitial cystitis     MTHFR mutation ([facility] [address]      Past Surgical History:   Procedure Laterality Date    HX LEFT SALPINGO-OOPHORECTOMY      HX TOTAL VAGINAL HYSTERECTOMY       Tobacco History:  reports that she has never smoked. She has never used smokeless tobacco.  Alcohol Abuse:  reports that she does not drink alcohol. Drug Abuse:  reports that she does not use drugs. Current Outpatient Medications   Medication Sig Dispense Refill    simvastatin (ZOCOR) 10 mg tablet TAKE 1 TABLET EVERY DAY IN THE EVENING  1    doxycycline monohydrate 40 mg capsule TAKE ONE CAPSULE BY MOUTH EVERY DAY  2    estradiol (VIVELLE) 0.0375 mg/24 hr 1 PATCH BY TRANSDERMAL ROUTE TWO (2) TIMES A WEEK. 24 Patch 2    ELIDEL 1 % topical cream as needed.        Allergies: Bactrim [sulfamethoprim]; Ciprofloxacin; Clindamycin;  Doxycycline; Morphine; and Sulfa (sulfonamide antibiotics)   Social History     Socioeconomic History    Marital status: SINGLE     Spouse name: Not on file    Number of children: Not on file    Years of education: Not on file    Highest education level: Not on file   Occupational History    Not on file   Social Needs    Financial resource strain: Not on file    Food insecurity:     Worry: Not on file     Inability: Not on file    Transportation needs:     Medical: Not on file     Non-medical: Not on file   Tobacco Use    Smoking status: Never Smoker    Smokeless tobacco: Never Used   Substance and Sexual Activity    Alcohol use: No    Drug use: No    Sexual activity: Not Currently   Lifestyle    Physical activity:     Days per week: Not on file     Minutes per session: Not on file    Stress: Not on file   Relationships    Social connections:     Talks on phone: Not on file     Gets together: Not on file     Attends Voodoo service: Not on file     Active member of club or organization: Not on file     Attends meetings of clubs or organizations: Not on file     Relationship status: Not on file    Intimate partner violence:     Fear of current or ex partner: Not on file     Emotionally abused: Not on file     Physically abused: Not on file     Forced sexual activity: Not on file   Other Topics Concern    Not on file   Social History Narrative    Not on file     Patient Active Problem List   Diagnosis Code    Transient right leg weakness R29.898    Hyperreflexia R29.2    Essential tremor G25.0    Episodic cluster headache, not intractable G44.019       Review of Systems - History obtained from the patient  Constitutional: negative for weight loss, fever, night sweats  HEENT: negative for hearing loss, earache, congestion, snoring, sorethroat  CV: negative for chest pain, palpitations, edema  Resp: negative for cough, shortness of breath, wheezing  GI: negative for change in bowel habits, abdominal pain, black or bloody stools  : negative for frequency, dysuria, hematuria, vaginal discharge  MSK: negative for back pain, joint pain, muscle pain  Breast: negative for breast lumps, nipple discharge, galactorrhea  Skin :negative for itching, rash, hives  Neuro: negative for dizziness, headache, confusion, weakness  Psych: negative for anxiety, depression, change in mood  Heme/lymph: negative for bleeding, bruising, pallor    Physical Exam    Visit Vitals  /76 (BP 1 Location: Left arm, BP Patient Position: Sitting)   Pulse 99   Resp 19   Ht 5' 3\" (1.6 m)   Wt 147 lb (66.7 kg)   BMI 26.04 kg/m²     Constitutional  · Appearance: well-nourished, well developed, alert, in no acute distress    HENT  · Head and Face: appears normal    Neck  · Inspection/Palpation: normal appearance, no masses or tenderness  Lymph Nodes: no lymphadenopathy present    Chest  · Respiratory Effort: breathing normal    Breasts  · Inspection of Breasts: breasts symmetrical, no skin changes, no discharge present, nipple appearance normal, no skin retraction present  · Palpation of Breasts and Axillae: no masses present on palpation, no breast tenderness  · Axillary Lymph Nodes: no lymphadenopathy present    Gastrointestinal  · Abdominal Examination: abdomen non-tender to palpation, normal bowel sounds, no masses present  · Liver and spleen: no hepatomegaly present, spleen not palpable  · Hernias: no hernias identified    Genitourinary  · External Genitalia: normal appearance for age, no discharge present, no tenderness present, no inflammatory lesions present, no masses present, no atrophy present  · Vagina: normal vaginal vault without central or paravaginal defects, no discharge present, no inflammatory lesions present, no masses present  · Bladder: non-tender to palpation  · Urethra: appears normal  · Cervix: absent  · Uterus: absent  · Adnexa: no adnexal tenderness present, no adnexal masses present  · Perineum: perineum within normal limits, no evidence of trauma, no rashes or skin lesions present  · Anus: anus within normal limits, no hemorrhoids present  · Inguinal Lymph Nodes: no lymphadenopathy present      Skin  · General Inspection: no rash, no lesions identified    Neurologic/Psychiatric  · Mental Status:  · Orientation: grossly oriented to person, place and time  · Mood and Affect: mood normal, affect appropriate    . Assessment:  Routine gynecologic examination  Her current medical status is satisfactory with no evidence of significant gynecologic issues.     Plan:  Counseled re: diet, exercise, healthy lifestyle  Return for yearly wellness visits  Rec annual mammogram  Patient Verbalized understanding  She will try Vivelle 0.025

## 2019-07-15 ENCOUNTER — APPOINTMENT (OUTPATIENT)
Dept: OBGYN CLINIC | Age: 59
End: 2019-07-15

## 2020-01-17 ENCOUNTER — HOSPITAL ENCOUNTER (EMERGENCY)
Age: 60
Discharge: HOME | End: 2020-01-17
Attending: EMERGENCY MEDICINE
Payer: COMMERCIAL

## 2020-01-17 ENCOUNTER — APPOINTMENT (OUTPATIENT)
Dept: GENERAL RADIOLOGY | Age: 60
End: 2020-01-17
Attending: EMERGENCY MEDICINE
Payer: COMMERCIAL

## 2020-01-17 VITALS
BODY MASS INDEX: 24.8 KG/M2 | TEMPERATURE: 97.5 F | SYSTOLIC BLOOD PRESSURE: 114 MMHG | WEIGHT: 140 LBS | HEIGHT: 63 IN | OXYGEN SATURATION: 98 % | HEART RATE: 79 BPM | RESPIRATION RATE: 16 BRPM | DIASTOLIC BLOOD PRESSURE: 72 MMHG

## 2020-01-17 DIAGNOSIS — R07.89 ATYPICAL CHEST PAIN: Primary | ICD-10-CM

## 2020-01-17 LAB
ANION GAP SERPL CALC-SCNC: 4 MMOL/L (ref 5–15)
BASOPHILS # BLD: 0.1 K/UL (ref 0–0.1)
BASOPHILS NFR BLD: 1 % (ref 0–1)
BUN SERPL-MCNC: 15 MG/DL (ref 6–20)
BUN/CREAT SERPL: 15 (ref 12–20)
CALCIUM SERPL-MCNC: 8.8 MG/DL (ref 8.5–10.1)
CHLORIDE SERPL-SCNC: 108 MMOL/L (ref 97–108)
CO2 SERPL-SCNC: 28 MMOL/L (ref 21–32)
COMMENT, HOLDF: NORMAL
CREAT SERPL-MCNC: 0.98 MG/DL (ref 0.55–1.02)
DIFFERENTIAL METHOD BLD: ABNORMAL
EOSINOPHIL # BLD: 0.2 K/UL (ref 0–0.4)
EOSINOPHIL NFR BLD: 3 % (ref 0–7)
ERYTHROCYTE [DISTWIDTH] IN BLOOD BY AUTOMATED COUNT: 13.6 % (ref 11.5–14.5)
GLUCOSE SERPL-MCNC: 99 MG/DL (ref 65–100)
HCT VFR BLD AUTO: 40.9 % (ref 35–47)
HGB BLD-MCNC: 13 G/DL (ref 11.5–16)
IMM GRANULOCYTES # BLD AUTO: 0 K/UL (ref 0–0.04)
IMM GRANULOCYTES NFR BLD AUTO: 0 % (ref 0–0.5)
LYMPHOCYTES # BLD: 3.2 K/UL (ref 0.8–3.5)
LYMPHOCYTES NFR BLD: 44 % (ref 12–49)
MCH RBC QN AUTO: 27.2 PG (ref 26–34)
MCHC RBC AUTO-ENTMCNC: 31.8 G/DL (ref 30–36.5)
MCV RBC AUTO: 85.6 FL (ref 80–99)
MONOCYTES # BLD: 0.5 K/UL (ref 0–1)
MONOCYTES NFR BLD: 7 % (ref 5–13)
NEUTS SEG # BLD: 3.3 K/UL (ref 1.8–8)
NEUTS SEG NFR BLD: 45 % (ref 32–75)
NRBC # BLD: 0 K/UL (ref 0–0.01)
NRBC BLD-RTO: 0 PER 100 WBC
PLATELET # BLD AUTO: 280 K/UL (ref 150–400)
PMV BLD AUTO: 8.8 FL (ref 8.9–12.9)
POTASSIUM SERPL-SCNC: 4 MMOL/L (ref 3.5–5.1)
RBC # BLD AUTO: 4.78 M/UL (ref 3.8–5.2)
SAMPLES BEING HELD,HOLD: NORMAL
SODIUM SERPL-SCNC: 140 MMOL/L (ref 136–145)
TROPONIN I SERPL-MCNC: <0.05 NG/ML
WBC # BLD AUTO: 7.3 K/UL (ref 3.6–11)

## 2020-01-17 PROCEDURE — 85025 COMPLETE CBC W/AUTO DIFF WBC: CPT

## 2020-01-17 PROCEDURE — 99284 EMERGENCY DEPT VISIT MOD MDM: CPT

## 2020-01-17 PROCEDURE — 93005 ELECTROCARDIOGRAM TRACING: CPT

## 2020-01-17 PROCEDURE — 84484 ASSAY OF TROPONIN QUANT: CPT

## 2020-01-17 PROCEDURE — 80048 BASIC METABOLIC PNL TOTAL CA: CPT

## 2020-01-17 PROCEDURE — 74011250637 HC RX REV CODE- 250/637: Performed by: EMERGENCY MEDICINE

## 2020-01-17 PROCEDURE — 74011000250 HC RX REV CODE- 250: Performed by: EMERGENCY MEDICINE

## 2020-01-17 PROCEDURE — 71046 X-RAY EXAM CHEST 2 VIEWS: CPT

## 2020-01-17 RX ORDER — FAMOTIDINE 20 MG/1
20 TABLET, FILM COATED ORAL
Status: COMPLETED | OUTPATIENT
Start: 2020-01-17 | End: 2020-01-17

## 2020-01-17 RX ORDER — GUAIFENESIN 100 MG/5ML
324 LIQUID (ML) ORAL
Status: COMPLETED | OUTPATIENT
Start: 2020-01-17 | End: 2020-01-17

## 2020-01-17 RX ADMIN — ASPIRIN 324 MG: 81 TABLET, CHEWABLE ORAL at 22:12

## 2020-01-17 RX ADMIN — LIDOCAINE HYDROCHLORIDE 40 ML: 20 SOLUTION ORAL; TOPICAL at 22:26

## 2020-01-17 RX ADMIN — FAMOTIDINE 20 MG: 20 TABLET ORAL at 22:24

## 2020-01-18 NOTE — ED TRIAGE NOTES
Pt ambulatory to triage, c/o \"chest discomfort\" starting at approx 1100 today. States that she was \"power walking around [employer] and felt discomfort after that time. Also c/o nausea and headache, denies vomiting or SOB. Took antacid at approx 1900, with some relief.

## 2020-01-18 NOTE — ED PROVIDER NOTES
64 y.o. female with past medical history significant for elevated cholesterol, interstitial cystitis, MTHFR mutation who presents ambulatory to the ED with chief complaint of chest pain. Patient reports that this morning at 1100 she went on a power walk around her office, sat down, and then began experiencing a \"burning chest pain\" with associated chest tightness that was a 8/10 in severity. Patient states that the chest pain persisted throughout the day, but is now currently a 4/10 in severity. She endorses previous similar episodes of chest pain, and states that pain is worse in th evenings and when she is lying supine. Patient notes that in October of 2019, she had a different type of chest pain with associated lightheadedness, nausea and diaphoresis that occurred with exertional activity while she was walking up hill. Patient denies being evaluated by cardiology. She endorses having nausea currently. Patient denies shortness of breath or leg swelling. There are no other acute medical concerns at this time. Social Hx: no Tobacco use, no EtOH use, no Illicit Drug use  PCP: Karis Marquez MD    Note written by Sherly Hannah, as dictated by Lidia Lay MD 9:54 PM      The history is provided by the patient. No  was used.         Past Medical History:   Diagnosis Date    Elevated cholesterol     Interstitial cystitis     MTHFR mutation ([facility] [address]        Past Surgical History:   Procedure Laterality Date    HX LEFT SALPINGO-OOPHORECTOMY      HX TOTAL VAGINAL HYSTERECTOMY           Family History:   Problem Relation Age of Onset    Breast Cancer Sister 55    Breast Cancer Maternal Aunt        Social History     Socioeconomic History    Marital status: SINGLE     Spouse name: Not on file    Number of children: Not on file    Years of education: Not on file    Highest education level: Not on file   Occupational History    Not on file   Social Needs    Financial resource strain: Not on file    Food insecurity:     Worry: Not on file     Inability: Not on file    Transportation needs:     Medical: Not on file     Non-medical: Not on file   Tobacco Use    Smoking status: Never Smoker    Smokeless tobacco: Never Used   Substance and Sexual Activity    Alcohol use: No    Drug use: No    Sexual activity: Not Currently   Lifestyle    Physical activity:     Days per week: Not on file     Minutes per session: Not on file    Stress: Not on file   Relationships    Social connections:     Talks on phone: Not on file     Gets together: Not on file     Attends Orthodoxy service: Not on file     Active member of club or organization: Not on file     Attends meetings of clubs or organizations: Not on file     Relationship status: Not on file    Intimate partner violence:     Fear of current or ex partner: Not on file     Emotionally abused: Not on file     Physically abused: Not on file     Forced sexual activity: Not on file   Other Topics Concern    Not on file   Social History Narrative    Not on file         ALLERGIES: Bactrim [sulfamethoprim]; Ciprofloxacin; Clindamycin; Doxycycline; Morphine; and Sulfa (sulfonamide antibiotics)    Review of Systems   Constitutional: Negative for chills and fever. Respiratory: Positive for chest tightness. Negative for shortness of breath. Cardiovascular: Positive for chest pain. Negative for palpitations and leg swelling. Gastrointestinal: Positive for nausea. Negative for abdominal pain, constipation, diarrhea and vomiting. Neurological: Negative for dizziness and light-headedness. All other systems reviewed and are negative. Vitals:    01/17/20 2125   BP: 151/82   Pulse: 84   Resp: 16   Temp: 97.5 °F (36.4 °C)   SpO2: 99%   Weight: 63.5 kg (140 lb)   Height: 5' 3\" (1.6 m)            Physical Exam  Vitals signs and nursing note reviewed. Constitutional:       Appearance: She is well-developed.    HENT:      Head: Normocephalic and atraumatic. Eyes:      Pupils: Pupils are equal, round, and reactive to light. Neck:      Musculoskeletal: Normal range of motion and neck supple. Cardiovascular:      Rate and Rhythm: Normal rate and regular rhythm. Pulmonary:      Effort: Pulmonary effort is normal.      Breath sounds: Normal breath sounds. Abdominal:      General: There is no distension. Palpations: Abdomen is soft. Tenderness: There is no tenderness. Skin:     General: Skin is warm and dry. Capillary Refill: Capillary refill takes less than 2 seconds. Neurological:      Mental Status: She is alert and oriented to person, place, and time. Psychiatric:         Behavior: Behavior normal.     Note written by Sherly Prater, as dictated by Georgie Romano MD 9:54 PM     MDM  Number of Diagnoses or Management Options  Atypical chest pain:   Diagnosis management comments: The patient is resting comfortably and feels better, is alert and in no distress. The repeat examination is unremarkable and benign. The electrocardiogram shows no signs of acute ischemia and the history, exam, diagnostic testing and current condition do not suggest that this patient is having an acute myocardial infarction, significant arrhythmia, unstable angina, esophageal perforation, pulmonary embolism, aortic dissection, pneumothorax, severe pneumonia, sepsis or other significant pathology that would warrant further testing, continued ED treatment, admission, or cardiology or other specialist consultation at this point. The vital signs have been stable. The patient's condition is stable and appropriate for discharge. The patient will pursue further outpatient evaluation with the primary care physician, other designated physician or cardiologist. The patient and/or caregivers have expressed a clear and thorough understanding and agree to follow up as instructed.     I discussed with the patient and/or caregiver the rapid rule-out protocol for acute coronary syndrome and myocardial infarction and that, given the findings during this ED evaluation, there is no clinical, ECG or laboratory evidence of injury to the heart. I further explained that there is no currently validated protocol that can reliably risk stratify a patient into the very low risk category of less than a 1-2% possibility of significant cardiac disease. Therefore it remains possible that there is underlying pathology that may develop into an acute coronary syndrome at some time in the future. I discussed this with the patient and/or caregivers at length, as well as the necessary steps that may include follow-up, stress testing, cardiac imaging and further lab evaluation for further assessment. The patient and/or caregivers have expressed a clear and thorough understanding and agree to follow up as instructed. Procedures    ED EKG interpretation:  Rhythm: normal sinus rhythm; and regular . Rate (approx.): 75 bpm; Axis: normal; ST/T wave: normal; No ectopy  Note written by Sherly Del Real, as dictated by Jovanni Boyle MD 10:12 PM    PROGRESS NOTE:  11:06 PM  Patient's chest x-ray is negative. Will discharge with follow up with cardiology    11:06 PM  Patient's results have been reviewed with them. Patient and/or family have verbally conveyed their understanding and agreement of the patient's signs, symptoms, diagnosis, treatment and prognosis and additionally agree to follow up as recommended or return to the Emergency Room should their condition change prior to follow-up. Discharge instructions have also been provided to the patient with some educational information regarding their diagnosis as well a list of reasons why they would want to return to the ER prior to their follow-up appointment should their condition change.

## 2020-01-20 ENCOUNTER — TELEPHONE (OUTPATIENT)
Dept: CARDIOLOGY CLINIC | Age: 60
End: 2020-01-20

## 2020-01-20 LAB
ATRIAL RATE: 75 BPM
CALCULATED P AXIS, ECG09: 26 DEGREES
CALCULATED R AXIS, ECG10: -19 DEGREES
CALCULATED T AXIS, ECG11: 19 DEGREES
DIAGNOSIS, 93000: NORMAL
P-R INTERVAL, ECG05: 134 MS
Q-T INTERVAL, ECG07: 392 MS
QRS DURATION, ECG06: 86 MS
QTC CALCULATION (BEZET), ECG08: 437 MS
VENTRICULAR RATE, ECG03: 75 BPM

## 2020-01-20 NOTE — TELEPHONE ENCOUNTER
Jacquie-     Please call and schedule patient for appointment with Dr. Jomar Humphreys. Dx; CP, new patient appointment. Thank you!

## 2020-02-25 NOTE — TELEPHONE ENCOUNTER
56year old patient last seen in the office on 4/16/18 for AE. ?  [id number] [address] for this nurse to refill till AE due on 4/19/18        Please advise Discussed with mother risks and benefits of CT/head imaging as well as PECARN study to decide observation vs imaging.  Mother decides Extensive discussion regarding: PECARN head rules/ CT imaging vs. observation had with mother and father (over the phone); decision made to observe for changes instead of imaging given clinical improvement up to this time;  Given return precautions and strict instructions for f/u and concussion treatment.  Will check xray rib series, L clavicle, observe and reeval; if stable and well will discharge w/ outpt f/u

## 2020-10-09 ENCOUNTER — OFFICE VISIT (OUTPATIENT)
Dept: OBGYN CLINIC | Age: 60
End: 2020-10-09
Payer: COMMERCIAL

## 2020-10-09 VITALS
SYSTOLIC BLOOD PRESSURE: 134 MMHG | WEIGHT: 148 LBS | DIASTOLIC BLOOD PRESSURE: 70 MMHG | BODY MASS INDEX: 26.22 KG/M2 | HEIGHT: 63 IN

## 2020-10-09 DIAGNOSIS — N63.10 BREAST MASS, RIGHT: Primary | ICD-10-CM

## 2020-10-09 DIAGNOSIS — Z01.419 WELL WOMAN EXAM WITH ROUTINE GYNECOLOGICAL EXAM: ICD-10-CM

## 2020-10-09 PROCEDURE — 99396 PREV VISIT EST AGE 40-64: CPT | Performed by: OBSTETRICS & GYNECOLOGY

## 2020-10-09 RX ORDER — ESTRADIOL 0.03 MG/D
1 FILM, EXTENDED RELEASE TRANSDERMAL 2 TIMES WEEKLY
Qty: 24 PATCH | Refills: 3 | Status: SHIPPED | OUTPATIENT
Start: 2020-10-09 | End: 2021-10-27

## 2020-10-09 NOTE — PROGRESS NOTES
Inocencio Noriega is a ,  64 y.o. female [address] whose No LMP recorded. Patient has had a hysterectomy. who presents for her annual checkup. She is having no significant problems. Hormone Status:    She is  having some vasomotor symptoms. The patient is using HRT: yes    Sexual history:    She  reports previously being sexually active. Medical conditions:    Since her last annual GYN exam about one year ago, she has had the following changes in her health history: none. Pap and Mammogram History:    Her most recent Pap smear was normal obtained 2 year(s) ago. The patient had her mammogram today in our office. Breast Cancer History/Substance Abuse:    She has a family history of breast cancer.- sister    Osteoporosis History:    Family history does not include a first or second degree relative with osteopenia or osteoporosis. A bone density scan has not been previously obtained. Past Medical History:   Diagnosis Date    Elevated cholesterol     Interstitial cystitis     MTHFR mutation ([facility] [address]      Past Surgical History:   Procedure Laterality Date    HX LEFT SALPINGO-OOPHORECTOMY      HX TOTAL VAGINAL HYSTERECTOMY       Tobacco History:  reports that she has never smoked. She has never used smokeless tobacco.  Alcohol Abuse:  reports no history of alcohol use. Drug Abuse:  reports no history of drug use. Current Outpatient Medications   Medication Sig Dispense Refill    estradiol (VIVELLE-DOT) 0.025 mg/24 hr ptsw 1 Patch by TransDERmal route two (2) times a week. 24 Patch 3    simvastatin (ZOCOR) 10 mg tablet TAKE 1 TABLET EVERY DAY IN THE EVENING  1    doxycycline monohydrate 40 mg capsule TAKE ONE CAPSULE BY MOUTH EVERY DAY  2    ELIDEL 1 % topical cream as needed. Allergies: Bactrim [sulfamethoprim]; Ciprofloxacin; Clindamycin;  Doxycycline; Morphine; and Sulfa (sulfonamide antibiotics)   Social History     Socioeconomic History    Marital status: SINGLE     Spouse name: Not on file    Number of children: Not on file    Years of education: Not on file    Highest education level: Not on file   Occupational History    Not on file   Social Needs    Financial resource strain: Not on file    Food insecurity     Worry: Not on file     Inability: Not on file    Transportation needs     Medical: Not on file     Non-medical: Not on file   Tobacco Use    Smoking status: Never Smoker    Smokeless tobacco: Never Used   Substance and Sexual Activity    Alcohol use: No    Drug use: No    Sexual activity: Not Currently   Lifestyle    Physical activity     Days per week: Not on file     Minutes per session: Not on file    Stress: Not on file   Relationships    Social connections     Talks on phone: Not on file     Gets together: Not on file     Attends Jainism service: Not on file     Active member of club or organization: Not on file     Attends meetings of clubs or organizations: Not on file     Relationship status: Not on file    Intimate partner violence     Fear of current or ex partner: Not on file     Emotionally abused: Not on file     Physically abused: Not on file     Forced sexual activity: Not on file   Other Topics Concern    Not on file   Social History Narrative    Not on file     Patient Active Problem List   Diagnosis Code    Transient right leg weakness R29.898    Hyperreflexia R29.2    Essential tremor G25.0    Episodic cluster headache, not intractable G44.019       Review of Systems - History obtained from the patient  Constitutional: negative for weight loss, fever, night sweats  HEENT: negative for hearing loss, earache, congestion, snoring, sorethroat  CV: negative for chest pain, palpitations, edema  Resp: negative for cough, shortness of breath, wheezing  GI: negative for change in bowel habits, abdominal pain, black or bloody stools  : negative for frequency, dysuria, hematuria, vaginal discharge  MSK: negative for back pain, joint pain, muscle pain  Breast: negative for breast lumps, nipple discharge, galactorrhea  Skin :negative for itching, rash, hives  Neuro: negative for dizziness, headache, confusion, weakness  Psych: negative for anxiety, depression, change in mood  Heme/lymph: negative for bleeding, bruising, pallor    Physical Exam    Visit Vitals  /70   Ht 5' 3\" (1.6 m)   Wt 148 lb (67.1 kg)   BMI 26.22 kg/m²     Constitutional  · Appearance: well-nourished, well developed, alert, in no acute distress    HENT  · Head and Face: appears normal    Neck  · Inspection/Palpation: normal appearance, no masses or tenderness  Lymph Nodes: no lymphadenopathy present    Chest  · Respiratory Effort: breathing normal    Breasts  · Inspection of Breasts: breasts symmetrical, no skin changes, no discharge present, nipple appearance normal, no skin retraction present  · Palpation of Breasts and Axillae: no masses present on palpation, no breast tenderness  · Axillary Lymph Nodes: no lymphadenopathy present    Gastrointestinal  · Abdominal Examination: abdomen non-tender to palpation, normal bowel sounds, no masses present  · Liver and spleen: no hepatomegaly present, spleen not palpable  · Hernias: no hernias identified    Genitourinary  · External Genitalia: normal appearance for age, no discharge present, no tenderness present, no inflammatory lesions present, no masses present, no atrophy present  · Vagina: normal vaginal vault without central or paravaginal defects, no discharge present, no inflammatory lesions present, no masses present  · Bladder: non-tender to palpation  · Urethra: appears normal  · Cervix: absent  · Uterus: absent  · Adnexa: no adnexal tenderness present, no adnexal masses present  · Perineum: perineum within normal limits, no evidence of trauma, no rashes or skin lesions present  · Anus: anus within normal limits, no hemorrhoids present  · Inguinal Lymph Nodes: no lymphadenopathy present      Skin  · General Inspection: no rash, no lesions identified    Neurologic/Psychiatric  · Mental Status:  · Orientation: grossly oriented to person, place and time  · Mood and Affect: mood normal, affect appropriate    . Assessment:  Routine gynecologic examination  Her current medical status is satisfactory with no evidence of significant gynecologic issues.   Menopuase    Plan:  Counseled re: diet, exercise, healthy lifestyle  Return for yearly wellness visits  Rec annual mammogram  Patient Verbalized understanding  She may try OTC meds for menopause instead of the Vivelle

## 2020-10-15 ENCOUNTER — HOSPITAL ENCOUNTER (OUTPATIENT)
Dept: MAMMOGRAPHY | Age: 60
Discharge: HOME | End: 2020-10-15
Attending: OBSTETRICS & GYNECOLOGY
Payer: COMMERCIAL

## 2020-10-15 ENCOUNTER — TRANSCRIBE ORDER (OUTPATIENT)
Dept: MAMMOGRAPHY | Age: 60
End: 2020-10-15

## 2020-10-15 DIAGNOSIS — R92.8 ABNORMAL MAMMOGRAM: Primary | ICD-10-CM

## 2020-10-15 DIAGNOSIS — N63.10 BREAST MASS, RIGHT: ICD-10-CM

## 2020-10-15 PROCEDURE — 76642 ULTRASOUND BREAST LIMITED: CPT

## 2020-10-15 NOTE — PROGRESS NOTES
Breast cysts are seen on her breast US. The radiologist recommends another follow up [address] in 3 months.

## 2020-10-20 DIAGNOSIS — N60.09 CYST OF BREAST, UNSPECIFIED LATERALITY: Primary | ICD-10-CM

## 2020-10-20 NOTE — PROGRESS NOTES
Pt informed and 3 mo f/u is already scheduled. Pt inquired about having breast cysts removed and strong fam hx of breast cancer.  Referral to [facility] Center made and pt will call to schedule

## 2020-10-27 ENCOUNTER — OFFICE VISIT (OUTPATIENT)
Dept: SURGERY | Age: 60
End: 2020-10-27
Payer: COMMERCIAL

## 2020-10-27 VITALS
SYSTOLIC BLOOD PRESSURE: 121 MMHG | TEMPERATURE: 97.8 F | BODY MASS INDEX: 25.69 KG/M2 | WEIGHT: 145 LBS | HEART RATE: 89 BPM | HEIGHT: 63 IN | DIASTOLIC BLOOD PRESSURE: 78 MMHG

## 2020-10-27 DIAGNOSIS — N64.4 MASTODYNIA OF RIGHT BREAST: ICD-10-CM

## 2020-10-27 DIAGNOSIS — R92.8 ABNORMAL ULTRASOUND OF BREAST: Primary | ICD-10-CM

## 2020-10-27 PROCEDURE — 99242 OFF/OP CONSLTJ NEW/EST SF 20: CPT | Performed by: SURGERY

## 2020-10-27 NOTE — LETTER
10/27/20 Patient: Sg Laws  
YOB: 1960 Date of Visit: 10/27/2020 Geri White MD 
[facility] [address] [facility] [address] [id number] VIA Facsimile: [phone] Renee Flowers MD 
[address] [facility] [address] VIA In Basket Dear MD Renee Segovia MD, Thank you for referring Ms. Juan Tobias to [address] [email or web address] for evaluation. My notes for this consultation are attached. If you have questions, please do not hesitate to call me. I look forward to following your patient along with you.  
 
 
Sincerely, 
 
Lorie Grady MD

## 2020-10-27 NOTE — PROGRESS NOTES
HISTORY OF PRESENT ILLNESS  Brianna Smith is a 64 y.o. female. HPI   NEW patient consult referred by  Dr. Hernando Duarte for occasional (once a month) RIGHT breast discomfort and abnormal ultrasound which showed a cluster of cysts. BIRADS 3. Since the mammogram and ultrasound she has more pain in the RIGHT breast, still intermittent. The pain has woken her up from sleep. She does not feel a mass. No skin change. Family History: Mother, breast cancer, dx at 66, survivor (2018)  Sister, breast cancer, dx at 50, survivor (2017)   Maternal aunt, breast cancer, dx in her 42's or 52's, survivor.  from another cancer. [facility] Results (most recent):  Results from [place] encounter on 10/09/20   [facility] 3D LESLIE W MAMMO BI SCREENING INCL CAD    Narrative STUDY: Bilateral digital screening mammogram with 3-D tomosynthesis    INDICATION:  Screening. COMPARISON: [id number]    BREAST COMPOSITION: There are scattered areas of fibroglandular density. FINDINGS: Bilateral digital screening mammography was performed and is  interpreted in conjunction with a computer assisted detection (CAD) system. Additionally, tomosynthesis of both breasts in the CC and MLO projections was  performed. In the left breast, no suspicious masses or calcifications are  identified. In the right lateral breast at 9:00 posterior depth 7 cm from the  nipple there is an oval low density circumscribed 0.7 cm mass. Impression IMPRESSION:  BI-RADS 0: Incomplete. Needs additional imaging evaluation. Right breast mass    RECOMMENDATIONS:  Right breast ultrasound     The patient will be notified of these results. US Results (most recent):  Results from [place] encounter on 10/15/20   US BREAST RT LIMITED=<3 QUAD    Narrative EXAM: US BREAST RT LIMITED=<3 QUAD    INDICATION:   Right breast mass seen on mammogram    COMPARISON:  Mammograms 10/9/2020 and 2018.     TECHNIQUE: High-resolution hand-held grayscale and selected color flow  evaluation of the area of mammographic concern in the right breast is performed. FINDINGS:  The exam demonstrates a probably benign-appearing 3 x 7 x 7 mm  cluster of cysts. No suspicious solid components are identified and no other  lesions evident. Impression IMPRESSION:  Probably benign cyst cluster right breast. BI-RADS Assessment  Category 3: Probably benign finding. Short-interval follow-up suggested. RECOMMENDATION: Right breast ultrasound in 3-6 months. Past Medical History:   Diagnosis Date    Elevated cholesterol     Interstitial cystitis     MTHFR mutation ([facility] [address]      Past Surgical History:   Procedure Laterality Date    HX LEFT SALPINGO-OOPHORECTOMY      HX TOTAL VAGINAL HYSTERECTOMY        OB History        2    Para   2    Term                AB        Living   2       SAB        TAB        Ectopic        Molar        Multiple        Live Births              Obstetric Comments   Menarche 17, LMP 42, # of children 2, age of 6st delivery 14, Hysterectomy/oophorectomy yes /yes but still has 1 ovary, Breast bx no, history of breast feeding no, BCP yes, Hormone therapy yes           Family History   Problem Relation Age of Onset    Breast Cancer Sister 55    Breast Cancer Maternal Aunt     Breast Cancer Mother 66     Social History     Tobacco Use    Smoking status: Never Smoker    Smokeless tobacco: Never Used   Substance Use Topics    Alcohol use: No      Prior to Admission medications    Medication Sig Start Date End Date Taking? Authorizing Provider   simvastatin (ZOCOR) 10 mg tablet TAKE 1 TABLET EVERY DAY IN THE EVENING 18  Yes Provider, Historical   estradioL (Vivelle-Dot) 0.025 mg/24 hr ptsw 1 Patch by TransDERmal route two (2) times a week. 10/9/20   Catalina Ponce MD   doxycycline monohydrate 40 mg capsule TAKE ONE CAPSULE BY MOUTH EVERY DAY 18   Provider, Historical   ELIDEL 1 % topical cream as needed.  3/15/16 Provider, Historical      Allergies   Allergen Reactions    Bactrim [Sulfamethoprim] Rash    Ciprofloxacin Rash    Clindamycin Rash    Doxycycline Rash    Morphine Nausea and Vomiting    Sulfa (Sulfonamide Antibiotics) Rash       Review of Systems   Constitutional: Negative. HENT: Negative. Eyes: Negative. Respiratory: Negative. Cardiovascular: Negative. Gastrointestinal: Positive for heartburn and nausea. Genitourinary: Negative. Musculoskeletal: Positive for back pain. Skin: Negative. Neurological: Negative. Endo/Heme/Allergies: Negative. Psychiatric/Behavioral: Negative. All other systems reviewed and are negative. Physical Exam  Chest:      Breasts: Breasts are symmetrical.         Right: Tenderness present. No inverted nipple, mass, nipple discharge or skin change. Left: No inverted nipple, mass, nipple discharge, skin change or tenderness. Lymphadenopathy:      Upper Body:      Right upper body: No supraclavicular or axillary adenopathy. Left upper body: No supraclavicular or axillary adenopathy. ASSESSMENT and PLAN    ICD-10-CM ICD-9-CM    1. Abnormal ultrasound of breast  R92.8 793.89    2. Mastodynia of right breast  N64.4 611.71         1. Pt will find out her sister's BRCA result  2. I have given her a pamphlet if she wants to send a BRCA test  3. Mammogram/ultrasound BIRADS 3. She has a cluster of cysts. Recheck in 6 months. She did not want cysts aspirated. They are asymptomtic  Follow up if any change on ultrasound in 6 months.

## 2021-04-01 ENCOUNTER — HOSPITAL ENCOUNTER (OUTPATIENT)
Dept: MAMMOGRAPHY | Age: 61
Discharge: HOME | End: 2021-04-01
Attending: OBSTETRICS & GYNECOLOGY
Payer: COMMERCIAL

## 2021-04-01 DIAGNOSIS — R92.8 ABNORMAL MAMMOGRAM: ICD-10-CM

## 2021-04-01 PROCEDURE — 76642 ULTRASOUND BREAST LIMITED: CPT

## 2021-10-21 NOTE — PROGRESS NOTES
Amanda Smith is a ,  64 y.o. female BLACK/ whose No LMP recorded. Patient has had a hysterectomy. who presents for her annual checkup. She is having no significant problems. She stopped her ERT last year. Hormone Status:    She is having mild vasomotor symptoms. The patient is not using HRT    Sexual history:    She  reports previously being sexually active. Medical conditions:    Since her last annual GYN exam about one year ago, she has had the following changes in her health history: none. Pap and Mammogram History:    Her most recent Pap smear was normal obtained 3 year(s) ago on 2018. The patient had her mammogram today in our office. Breast Cancer History/Substance Abuse:    She has a family history of breast cancer. Osteoporosis History:    Family history does not include a first or second degree relative with osteopenia or osteoporosis. A bone density scan has not been previously obtained. Past Medical History:   Diagnosis Date    Elevated cholesterol     Interstitial cystitis     MTHFR mutation      Past Surgical History:   Procedure Laterality Date    HX LEFT SALPINGO-OOPHORECTOMY      HX TOTAL VAGINAL HYSTERECTOMY       Tobacco History:  reports that she has never smoked. She has never used smokeless tobacco.  Alcohol Abuse:  reports no history of alcohol use. Drug Abuse:  reports no history of drug use. Current Outpatient Medications   Medication Sig Dispense Refill    multivitamin (VITAMIN DAILY PO) Take  by mouth.  simvastatin (ZOCOR) 10 mg tablet TAKE 1 TABLET EVERY DAY IN THE EVENING  1    estradioL (Vivelle-Dot) 0.025 mg/24 hr ptsw 1 Patch by TransDERmal route two (2) times a week. (Patient not taking: Reported on 10/27/2021) 24 Patch 3    doxycycline monohydrate 40 mg capsule TAKE ONE CAPSULE BY MOUTH EVERY DAY (Patient not taking: Reported on 10/27/2021)  2    ELIDEL 1 % topical cream as needed.  (Patient not taking: Reported on 10/27/2021)       Allergies: Bactrim [sulfamethoprim], Ciprofloxacin, Clindamycin, Doxycycline, Morphine, and Sulfa (sulfonamide antibiotics)   Social History     Socioeconomic History    Marital status: SINGLE     Spouse name: Not on file    Number of children: Not on file    Years of education: Not on file    Highest education level: Not on file   Occupational History    Not on file   Tobacco Use    Smoking status: Never Smoker    Smokeless tobacco: Never Used   Substance and Sexual Activity    Alcohol use: No    Drug use: No    Sexual activity: Not Currently   Other Topics Concern    Not on file   Social History Narrative    Not on file     Social Determinants of Health     Financial Resource Strain:     Difficulty of Paying Living Expenses:    Food Insecurity:     Worried About Running Out of Food in the Last Year:     [address] in the Last Year:    Transportation Needs:     Lack of Transportation (Medical):      Lack of Transportation (Non-Medical):    Physical Activity:     Days of Exercise per Week:     Minutes of Exercise per Session:    Stress:     Feeling of Stress :    Social Connections:     Frequency of Communication with Friends and Family:     Frequency of Social Gatherings with Friends and Family:     Attends Muslim Services:     Active Member of Clubs or Organizations:     Attends Club or Organization Meetings:     Marital Status:    Intimate Partner Violence:     Fear of Current or Ex-Partner:     Emotionally Abused:     Physically Abused:     Sexually Abused:      Patient Active Problem List   Diagnosis Code    Transient right leg weakness R29.898    Hyperreflexia R29.2    Essential tremor G25.0    Episodic cluster headache, not intractable G44.019       Review of Systems - History obtained from the patient  Constitutional: negative for weight loss, fever, night sweats  HEENT: negative for hearing loss, earache, congestion, snoring, sorethroat  CV: negative for chest pain, palpitations, edema  Resp: negative for cough, shortness of breath, wheezing  GI: negative for change in bowel habits, abdominal pain, black or bloody stools  : negative for frequency, dysuria, hematuria, vaginal discharge  MSK: negative for back pain, joint pain, muscle pain  Breast: negative for breast lumps, nipple discharge, galactorrhea  Skin :negative for itching, rash, hives  Neuro: negative for dizziness, headache, confusion, weakness  Psych: negative for anxiety, depression, change in mood  Heme/lymph: negative for bleeding, bruising, pallor    Physical Exam    Visit Vitals  /82   Wt 137 lb (62.1 kg)   BMI 24.27 kg/m²     Constitutional  · Appearance: well-nourished, well developed, alert, in no acute distress    HENT  · Head and Face: appears normal    Neck  · Inspection/Palpation: normal appearance, no masses or tenderness  Lymph Nodes: no lymphadenopathy present    Chest  · Respiratory Effort: breathing normal    Breasts  · Inspection of Breasts: breasts symmetrical, no skin changes, no discharge present, nipple appearance normal, no skin retraction present  · Palpation of Breasts and Axillae: no masses present on palpation, no breast tenderness  · Axillary Lymph Nodes: no lymphadenopathy present    Gastrointestinal  · Abdominal Examination: abdomen non-tender to palpation, normal bowel sounds, no masses present  · Liver and spleen: no hepatomegaly present, spleen not palpable  · Hernias: no hernias identified    Genitourinary  · External Genitalia: normal appearance for age, no discharge present, no tenderness present, no inflammatory lesions present, no masses present, no atrophy present  · Vagina: normal vaginal vault without central or paravaginal defects, no discharge present, no inflammatory lesions present, no masses present  · Bladder: non-tender to palpation  · Urethra: appears normal  · Cervix: absent  · Uterus: absent  · Adnexa: no adnexal tenderness present, no adnexal masses present  · Perineum: perineum within normal limits, no evidence of trauma, no rashes or skin lesions present  · Anus: anus within normal limits, no hemorrhoids present  · Inguinal Lymph Nodes: no lymphadenopathy present      Skin  · General Inspection: no rash, no lesions identified    Neurologic/Psychiatric  · Mental Status:  · Orientation: grossly oriented to person, place and time  · Mood and Affect: mood normal, affect appropriate    . Assessment:  Routine gynecologic examination  Her current medical status is satisfactory with no evidence of significant gynecologic issues.     Plan:  Counseled re: diet, exercise, healthy lifestyle  Return for yearly wellness visits  Rec annual mammogram  Patient Verbalized understanding

## 2021-10-27 ENCOUNTER — OFFICE VISIT (OUTPATIENT)
Dept: OBGYN CLINIC | Age: 61
End: 2021-10-27

## 2021-10-27 VITALS — BODY MASS INDEX: 24.27 KG/M2 | WEIGHT: 137 LBS | SYSTOLIC BLOOD PRESSURE: 128 MMHG | DIASTOLIC BLOOD PRESSURE: 82 MMHG

## 2021-10-27 DIAGNOSIS — Z01.419 WELL WOMAN EXAM WITH ROUTINE GYNECOLOGICAL EXAM: Primary | ICD-10-CM

## 2021-10-27 DIAGNOSIS — Z01.419 WELL WOMAN EXAM WITH ROUTINE GYNECOLOGICAL EXAM: ICD-10-CM

## 2021-10-27 PROCEDURE — 99396 PREV VISIT EST AGE 40-64: CPT | Performed by: OBSTETRICS & GYNECOLOGY

## 2021-10-27 NOTE — PATIENT INSTRUCTIONS

## 2021-11-01 LAB
CYTOLOGIST CVX/VAG CYTO: NORMAL
CYTOLOGY CVX/VAG DOC CYTO: NORMAL
CYTOLOGY CVX/VAG DOC THIN PREP: NORMAL
CYTOLOGY HISTORY:: NORMAL
DX ICD CODE: NORMAL
HPV I/H RISK 4 DNA CVX QL PROBE+SIG AMP: NEGATIVE
Lab: NORMAL
OTHER STN SPEC: NORMAL
STAT OF ADQ CVX/VAG CYTO-IMP: NORMAL

## 2022-10-03 ENCOUNTER — TRANSCRIBE ORDER (OUTPATIENT)
Dept: SCHEDULING | Age: 62
End: 2022-10-03

## 2022-10-03 DIAGNOSIS — Z12.31 SCREENING MAMMOGRAM FOR HIGH-RISK PATIENT: Primary | ICD-10-CM

## 2022-11-03 NOTE — PROGRESS NOTES
Carrie Spence is a ,  63 y.o. female BLACK/ whose No LMP recorded. Patient has had a hysterectomy. was on  who presents for her annual checkup. She is having  c/o wanting a new Rx for HRT d/t night sweats and hot flashes . Hormone Status:    She is not having vasomotor symptoms. The patient is using HRT: none    Sexual history:    She  reports that she is not currently sexually active. Medical conditions:    Since her last annual GYN exam about one year ago, she has had the following changes in her health history: none. Pap and Mammogram History:    Her most recent Pap smear was normal obtained 1 year(s) ago on 10/27/21. The patient had a mammogram today in the hospital.     Breast Cancer History/Substance Abuse:    She has a family history of breast cancer. Osteoporosis History:    Family history does not include a first or second degree relative with osteopenia or osteoporosis. A bone density scan has not been previously obtained. Past Medical History:   Diagnosis Date    Elevated cholesterol     Interstitial cystitis     MTHFR mutation      Past Surgical History:   Procedure Laterality Date    HX LEFT SALPINGO-OOPHORECTOMY      HX TOTAL VAGINAL HYSTERECTOMY       Tobacco History:  reports that she has never smoked. She has never used smokeless tobacco.  Alcohol Abuse:  reports no history of alcohol use. Drug Abuse:  reports no history of drug use. Current Outpatient Medications   Medication Sig Dispense Refill    multivitamin (VITAMIN DAILY PO) Take  by mouth.       simvastatin (ZOCOR) 10 mg tablet TAKE 1 TABLET EVERY DAY IN THE EVENING  1     Allergies: Bactrim [sulfamethoprim], Ciprofloxacin, Clindamycin, Doxycycline, Morphine, and Sulfa (sulfonamide antibiotics)   Social History     Socioeconomic History    Marital status: SINGLE     Spouse name: Not on file    Number of children: Not on file    Years of education: Not on file    Highest education level: Not on file   Occupational History    Not on file   Tobacco Use    Smoking status: Never    Smokeless tobacco: Never   Substance and Sexual Activity    Alcohol use: No    Drug use: No    Sexual activity: Not Currently   Other Topics Concern    Not on file   Social History Narrative    Not on file     Social Determinants of Health     Financial Resource Strain: Not on file   Food Insecurity: Not on file   Transportation Needs: Not on file   Physical Activity: Not on file   Stress: Not on file   Social Connections: Not on file   Intimate Partner Violence: Not on file   Housing Stability: Not on file     Patient Active Problem List   Diagnosis Code    Transient right leg weakness R29.898    Hyperreflexia R29.2    Essential tremor G25.0    Episodic cluster headache, not intractable G44.019       Review of Systems - History obtained from the patient  Constitutional: negative for weight loss, fever, night sweats  HEENT: negative for hearing loss, earache, congestion, snoring, sorethroat  CV: negative for chest pain, palpitations, edema  Resp: negative for cough, shortness of breath, wheezing  GI: negative for change in bowel habits, abdominal pain, black or bloody stools  : negative for frequency, dysuria, hematuria, vaginal discharge  MSK: negative for back pain, joint pain, muscle pain  Breast: negative for breast lumps, nipple discharge, galactorrhea  Skin :negative for itching, rash, hives  Neuro: negative for dizziness, headache, confusion, weakness  Psych: negative for anxiety, depression, change in mood  Heme/lymph: negative for bleeding, bruising, pallor    Physical Exam    There were no vitals taken for this visit.   Constitutional  Appearance: well-nourished, well developed, alert, in no acute distress    HENT  Head and Face: appears normal    Neck  Inspection/Palpation: normal appearance, no masses or tenderness  Lymph Nodes: no lymphadenopathy present    Chest  Respiratory Effort: breathing normal    Breasts  Inspection of Breasts: breasts symmetrical, no skin changes, no discharge present, nipple appearance normal, no skin retraction present  Palpation of Breasts and Axillae: no masses present on palpation, no breast tenderness  Axillary Lymph Nodes: no lymphadenopathy present    Gastrointestinal  Abdominal Examination: abdomen non-tender to palpation, normal bowel sounds, no masses present  Liver and spleen: no hepatomegaly present, spleen not palpable  Hernias: no hernias identified    Genitourinary  External Genitalia: normal appearance for age, no discharge present, no tenderness present, no inflammatory lesions present, no masses present, no atrophy present  Vagina: normal vaginal vault without central or paravaginal defects, no discharge present, no inflammatory lesions present, no masses present  Bladder: non-tender to palpation  Urethra: appears normal  Cervix: absent  Uterus: absent  Adnexa: no adnexal tenderness present, no adnexal masses present  Perineum: perineum within normal limits, no evidence of trauma, no rashes or skin lesions present  Anus: anus within normal limits, no hemorrhoids present  Inguinal Lymph Nodes: no lymphadenopathy present      Skin  General Inspection: no rash, no lesions identified    Neurologic/Psychiatric  Mental Status:  Orientation: grossly oriented to person, place and time  Mood and Affect: mood normal, affect appropriate    . Assessment:  Routine gynecologic examination  Her current medical status is satisfactory with menopausal symptoms    Plan:  Counseled re: diet, exercise, healthy lifestyle  Return for yearly wellness visits  Rec annual mammogram  Patient Verbalized understanding  She will restart ERT. Aware of risks of breast cancer and PE/DVT.  Advised of elevated BP

## 2022-11-04 ENCOUNTER — HOSPITAL ENCOUNTER (OUTPATIENT)
Dept: MAMMOGRAPHY | Age: 62
Discharge: HOME | End: 2022-11-04
Attending: OBSTETRICS & GYNECOLOGY
Payer: COMMERCIAL

## 2022-11-04 ENCOUNTER — OFFICE VISIT (OUTPATIENT)
Dept: OBGYN CLINIC | Age: 62
End: 2022-11-04
Payer: COMMERCIAL

## 2022-11-04 VITALS — SYSTOLIC BLOOD PRESSURE: 151 MMHG | DIASTOLIC BLOOD PRESSURE: 92 MMHG | BODY MASS INDEX: 25.86 KG/M2 | WEIGHT: 146 LBS

## 2022-11-04 DIAGNOSIS — Z01.419 WELL WOMAN EXAM WITH ROUTINE GYNECOLOGICAL EXAM: ICD-10-CM

## 2022-11-04 DIAGNOSIS — Z12.31 SCREENING MAMMOGRAM FOR HIGH-RISK PATIENT: ICD-10-CM

## 2022-11-04 DIAGNOSIS — Z01.419 ENCOUNTER FOR GYNECOLOGICAL EXAMINATION WITHOUT ABNORMAL FINDING: Primary | ICD-10-CM

## 2022-11-04 PROCEDURE — 77063 BREAST TOMOSYNTHESIS BI: CPT

## 2022-11-04 PROCEDURE — 99396 PREV VISIT EST AGE 40-64: CPT | Performed by: OBSTETRICS & GYNECOLOGY

## 2022-11-04 RX ORDER — ESTRADIOL 0.03 MG/D
1 FILM, EXTENDED RELEASE TRANSDERMAL 2 TIMES WEEKLY
Qty: 24 PATCH | Refills: 3 | Status: SHIPPED | OUTPATIENT
Start: 2022-11-04

## 2023-03-06 ENCOUNTER — TRANSCRIBE ORDER (OUTPATIENT)
Dept: SCHEDULING | Age: 63
End: 2023-03-06

## 2023-03-06 DIAGNOSIS — R19.7 DIARRHEA: ICD-10-CM

## 2023-03-06 DIAGNOSIS — R63.0 LOSS OF APPETITE: ICD-10-CM

## 2023-03-06 DIAGNOSIS — R19.4 BOWEL HABIT CHANGES: ICD-10-CM

## 2023-03-06 DIAGNOSIS — R11.0 NAUSEA: ICD-10-CM

## 2023-03-06 DIAGNOSIS — R68.81 EARLY SATIETY: Primary | ICD-10-CM

## 2023-03-28 ENCOUNTER — HOSPITAL ENCOUNTER (OUTPATIENT)
Dept: NUCLEAR MEDICINE | Age: 63
Discharge: HOME | End: 2023-03-28
Attending: PHYSICIAN ASSISTANT
Payer: COMMERCIAL

## 2023-03-28 DIAGNOSIS — R19.7 DIARRHEA: ICD-10-CM

## 2023-03-28 DIAGNOSIS — R68.81 EARLY SATIETY: ICD-10-CM

## 2023-03-28 DIAGNOSIS — R19.4 BOWEL HABIT CHANGES: ICD-10-CM

## 2023-03-28 DIAGNOSIS — R11.0 NAUSEA: ICD-10-CM

## 2023-03-28 DIAGNOSIS — R63.0 LOSS OF APPETITE: ICD-10-CM

## 2023-03-28 PROCEDURE — 78264 GASTRIC EMPTYING IMG STUDY: CPT

## 2023-03-28 RX ORDER — TECHNETIUM TC 99M SULFUR COLLOID 2 MG
0.3 KIT MISCELLANEOUS
Status: COMPLETED | OUTPATIENT
Start: 2023-03-28 | End: 2023-03-28

## 2023-03-28 RX ADMIN — TECHNETIUM TC 99M SULFUR COLLOID 0.3 MILLICURIE: KIT at 11:35

## 2023-07-27 ENCOUNTER — TRANSCRIBE ORDERS (OUTPATIENT)
Facility: HOSPITAL | Age: 63
End: 2023-07-27

## 2023-07-27 DIAGNOSIS — R22.2 MASS OF CHEST WALL: Primary | ICD-10-CM

## 2023-08-10 ENCOUNTER — APPOINTMENT (OUTPATIENT)
Facility: HOSPITAL | Age: 63
End: 2023-08-10
Attending: FAMILY MEDICINE
Payer: COMMERCIAL

## 2023-08-10 ENCOUNTER — HOSPITAL ENCOUNTER (OUTPATIENT)
Facility: HOSPITAL | Age: 63
Discharge: HOME | End: 2023-08-10
Attending: FAMILY MEDICINE
Payer: COMMERCIAL

## 2023-08-10 ENCOUNTER — HOSPITAL ENCOUNTER (OUTPATIENT)
Facility: HOSPITAL | Age: 63
End: 2023-08-10
Attending: FAMILY MEDICINE
Payer: COMMERCIAL

## 2023-08-10 DIAGNOSIS — R22.2 MASS OF CHEST WALL, RIGHT: ICD-10-CM

## 2023-08-10 PROCEDURE — 71260 CT THORAX DX C+: CPT

## 2023-08-10 PROCEDURE — 6360000004 HC RX CONTRAST MEDICATION: Performed by: FAMILY MEDICINE

## 2023-08-10 RX ADMIN — IOPAMIDOL 80 ML: 612 INJECTION, SOLUTION INTRATHECAL at 11:29

## 2023-09-06 ENCOUNTER — HOSPITAL ENCOUNTER (OUTPATIENT)
Facility: HOSPITAL | Age: 63
Discharge: HOME | End: 2023-09-09
Attending: FAMILY MEDICINE
Payer: COMMERCIAL

## 2023-09-06 ENCOUNTER — HOSPITAL ENCOUNTER (OUTPATIENT)
Facility: HOSPITAL | Age: 63
End: 2023-09-06
Attending: FAMILY MEDICINE
Payer: COMMERCIAL

## 2023-09-06 DIAGNOSIS — N63.12 MASS OF UPPER INNER QUADRANT OF RIGHT BREAST: ICD-10-CM

## 2023-09-06 PROCEDURE — G0279 TOMOSYNTHESIS, MAMMO: HCPCS

## 2023-09-06 PROCEDURE — 76604 US EXAM CHEST: CPT

## 2023-10-24 ENCOUNTER — TRANSCRIBE ORDERS (OUTPATIENT)
Facility: HOSPITAL | Age: 63
End: 2023-10-24

## 2023-10-24 DIAGNOSIS — Z12.31 VISIT FOR SCREENING MAMMOGRAM: Primary | ICD-10-CM

## 2023-11-29 ENCOUNTER — HOSPITAL ENCOUNTER (OUTPATIENT)
Facility: HOSPITAL | Age: 63
Discharge: HOME | End: 2023-12-02
Attending: FAMILY MEDICINE
Payer: COMMERCIAL

## 2023-11-29 VITALS — BODY MASS INDEX: 24.98 KG/M2 | HEIGHT: 63 IN | WEIGHT: 141 LBS

## 2023-11-29 DIAGNOSIS — Z12.31 VISIT FOR SCREENING MAMMOGRAM: ICD-10-CM

## 2023-11-29 PROCEDURE — 77063 BREAST TOMOSYNTHESIS BI: CPT

## 2024-01-22 ENCOUNTER — TELEPHONE (OUTPATIENT)
Age: 64
End: 2024-01-22

## 2024-01-22 NOTE — TELEPHONE ENCOUNTER
Called and s/w pt as a medication refill req was received from [facility] for estradiol patch.     Pt states she did not request refill and states she does not need any. Pt req not to refill at this time.

## 2024-11-12 ENCOUNTER — TRANSCRIBE ORDERS (OUTPATIENT)
Facility: HOSPITAL | Age: 64
End: 2024-11-12

## 2024-11-12 DIAGNOSIS — Z12.31 VISIT FOR SCREENING MAMMOGRAM: Primary | ICD-10-CM

## 2024-12-12 ENCOUNTER — HOSPITAL ENCOUNTER (OUTPATIENT)
Facility: HOSPITAL | Age: 64
Discharge: HOME | End: 2024-12-15
Attending: FAMILY MEDICINE
Payer: COMMERCIAL

## 2024-12-12 VITALS — BODY MASS INDEX: 24.98 KG/M2 | WEIGHT: 141 LBS | HEIGHT: 63 IN

## 2024-12-12 DIAGNOSIS — Z12.31 VISIT FOR SCREENING MAMMOGRAM: ICD-10-CM

## 2024-12-12 PROCEDURE — 77063 BREAST TOMOSYNTHESIS BI: CPT

## 2025-06-25 ENCOUNTER — OFFICE VISIT (OUTPATIENT)
Age: 65
End: 2025-06-25

## 2025-06-25 VITALS
RESPIRATION RATE: 16 BRPM | BODY MASS INDEX: 24.27 KG/M2 | DIASTOLIC BLOOD PRESSURE: 93 MMHG | HEIGHT: 63 IN | SYSTOLIC BLOOD PRESSURE: 152 MMHG | OXYGEN SATURATION: 97 % | WEIGHT: 137 LBS | HEART RATE: 67 BPM | TEMPERATURE: 97.9 F

## 2025-06-25 DIAGNOSIS — K57.92 DIVERTICULITIS: Primary | ICD-10-CM

## 2025-06-25 DIAGNOSIS — R03.0 ELEVATED BLOOD PRESSURE READING: ICD-10-CM

## 2025-06-25 DIAGNOSIS — R10.2 PELVIC PAIN: ICD-10-CM

## 2025-06-25 DIAGNOSIS — R11.2 NAUSEA AND VOMITING, UNSPECIFIED VOMITING TYPE: ICD-10-CM

## 2025-06-25 LAB
BILIRUBIN, URINE, POC: NEGATIVE
BLOOD URINE, POC: ABNORMAL
GLUCOSE URINE, POC: NEGATIVE
KETONES, URINE, POC: NEGATIVE
LEUKOCYTE ESTERASE, URINE, POC: NEGATIVE
NITRITE, URINE, POC: NEGATIVE
PH, URINE, POC: 7 (ref 4.6–8)
PROTEIN,URINE, POC: NEGATIVE
SPECIFIC GRAVITY, URINE, POC: 1.01 (ref 1–1.03)
URINALYSIS CLARITY, POC: CLEAR
URINALYSIS COLOR, POC: YELLOW
UROBILINOGEN, POC: NORMAL MG/DL

## 2025-06-25 RX ORDER — ONDANSETRON 4 MG/1
4 TABLET, ORALLY DISINTEGRATING ORAL 3 TIMES DAILY PRN
Qty: 15 TABLET | Refills: 0 | Status: SHIPPED | OUTPATIENT
Start: 2025-06-25 | End: 2025-06-30

## 2025-06-25 RX ORDER — SIMVASTATIN 20 MG
TABLET ORAL
COMMUNITY
Start: 2025-06-16

## 2025-06-25 ASSESSMENT — ENCOUNTER SYMPTOMS
DIARRHEA: 1
ABDOMINAL PAIN: 1
VOMITING: 1
CONSTIPATION: 1
NAUSEA: 1

## 2025-06-25 NOTE — PROGRESS NOTES
2025   Nicolette Caba (: 1960) is a 64 y.o. female, New patient, here for evaluation of the following chief complaint(s):  Abdominal Pain (Patient presents for nausea, diarrhea and vomiting which occurred on 25. Reports experiencing cramping and left pelvic pain yesterday. She also has been a bit constipated. She was unable to sleep well last night due to discomfort. Took Tylenol earlier today to help with pelvic pain.)       ASSESSMENT/PLAN:  Below is the assessment and plan developed based on review of pertinent history, physical exam, labs, studies, and medications.  1. Diverticulitis  2. Nausea and vomiting, unspecified vomiting type  3. Pelvic pain  -     AMB POC URINALYSIS DIP STICK MANUAL W/O MICRO  4. Elevated blood pressure reading       X-rays were viewed and preliminarily interpreted by this provider.      Handout given with care instructions  Pt with stable VS, non-toxic appearing  Pt in no acute distress and afebrile   4.   OTC for symptom management. Increase fluid intake, ensure adequate nutritional intake.  5.   Follow up with PCP as needed.  6.   Go to ED with development of any acute symptoms.     Follow up:  Return if symptoms worsen or fail to improve.  Follow up immediately for any new, worsening or changes or if symptoms are not improving over the next 5-7 days.     SUBJECTIVE/OBJECTIVE:    History provided by:  Patient   used: No    Abdominal Pain  This is a new problem. The current episode started in the past 7 days. The onset quality is sudden. The problem occurs 2 to 4 times per day. The problem has been gradually worsening. The pain is located in the LLQ. The pain is moderate. The quality of the pain is cramping and sharp. The abdominal pain does not radiate. Associated symptoms include constipation, diarrhea, nausea and vomiting. Pertinent negatives include no fever or melena. She has tried acetaminophen for the symptoms. The treatment provided

## 2025-06-25 NOTE — PATIENT INSTRUCTIONS
- Start with liquid diet    Alternate every 4 hours:    Tylenol up to 1000mg  with Ibuprofen up to 800mg    (Do not take Tylenol or Ibuprofen more frequently then every 8 hours)    Thank you for choosing [facility].  I hope you feel better soon!      - Try TUMS prior to eating or drinking to settle stomach acid    - eat Boiled starches and cereals (eg, potatoes, noodles, rice, wheat, and oat) with salt are indicated in patients with watery diarrhea; crackers, bananas, soup, and boiled vegetables may also be consumed     - drink plenty of fluids: water, sports drinks (low or no sugar); Pedialyte or ice pops    Follow up with PCP within 7-10 days    Thank you for choosing [facility].  I hope you feel better soon!